# Patient Record
Sex: FEMALE | Race: WHITE | URBAN - METROPOLITAN AREA
[De-identification: names, ages, dates, MRNs, and addresses within clinical notes are randomized per-mention and may not be internally consistent; named-entity substitution may affect disease eponyms.]

---

## 2017-01-04 ENCOUNTER — OFFICE VISIT (OUTPATIENT)
Dept: FAMILY MEDICINE | Facility: CLINIC | Age: 32
End: 2017-01-04
Payer: COMMERCIAL

## 2017-01-04 DIAGNOSIS — Z12.83 SKIN CANCER SCREENING: Primary | ICD-10-CM

## 2017-01-04 DIAGNOSIS — D22.9 MULTIPLE BENIGN MELANOCYTIC NEVI: ICD-10-CM

## 2017-01-04 DIAGNOSIS — Z86.018 HISTORY OF DYSPLASTIC NEVUS: ICD-10-CM

## 2017-01-04 PROCEDURE — 99213 OFFICE O/P EST LOW 20 MIN: CPT | Performed by: FAMILY MEDICINE

## 2017-01-04 NOTE — PROGRESS NOTES
[facility] - PRIMARY CARE SKIN    CC : skin cancer screening (full-body)  SUBJECTIVE:                                                    Lucille Nathan is a 31 year old female who presents to clinic today for a full-body skin exam because of her history of dysplastic nevus and numerous nevi.     Bothersome lesions noticed by the patient or other skin concerns :  Issue One : Since pregnancy began 6 months ago, new moles have been noted on the breasts.  New : YES.  Enlarging : NO.  Bleeding : NO  Itchy or irritating : NO.  Pain or tenderness : NO.    Personal history of skin cancer : NO - history of moderately dysplastic nevus on left mid-abdomen.  Family history of skin cancer : NO.    Sun Exposure History  Sunscreen Use : YES.  Previous history of significant sun exposure: YES - tanned extensively in college    Occupation : office (indoor).    Refer to electronic medical record (EMR) for past medical history and medications.    INTEGUMENTARY/SKIN: POSITIVE for mole changes  ROS : 14 point review of systems was negative except the symptoms listed above in the HPI.    This document serves as a record of the services and decisions personally performed and made by Natasha Aguilar MD. It was created on her behalf by Dilan Gonsales, a trained medical scribe.  The creation of this document is based on the scribe's personal observations and the provider's statements to the medical scribe.  Dilan Gonsales, January 4, 2017 8:06 AM      OBJECTIVE:                                                    GENERAL: healthy, alert and no distress  SKIN: [place]ouse Skin Type - I.  This patient was examined from the top of the head to the bottom of the feet  including scalp, face, neck, back, chest, breasts, buttocks, both arms, both legs, both hands, both feet, all 10 fingers and all 10 toes. The dermatoscope was used to help evaluate pigmented lesions.  Skin Pertinent Findings:  Arms : Scattered, 2 mm - 3 mm in size, brown macules most  "consistent with benign nevi (melanocytic nevi).    Right medial great toe : 2 mm in size, brown macule most consistent with a benign nevus.    Back : Multiple, 2 mm - 5 mm in size, brown macules most consistent with benign nevi (melanocytic nevi).    Diagnostic Test Results:  none     .      ASSESSMENT:                                                      Encounter Diagnoses   Name Primary?     Skin cancer screening Yes     History of dysplastic nevus      Multiple benign melanocytic nevi          PLAN:                                                    Patient Instructions   FUTURE APPOINTMENTS  Follow up in 6 month(s) for a full-body skin cancer screening.    TIPS FOR AVOIDING SKIN CANCER AND PREMATURE SKIN AGING  DOs    Wear a wide-brimmed hat and sunglasses.     Wear sun-protective clothing.    [employer] and other [employer] make sun protective clothing that is stylish, comfortable and cool.    [employer] and other [employer] make UV arm sleeves suitable for golfing, gardening and other activities.      Wear sunscreen on your face every day, even in the winter. (UVA \"aging rays\" penetrates window glass and is just as strong in the winter as in the summer) Sunscreen with SPF > 30 is recommended.    Wear sunscreen on your body and re-apply every 2 hours when exposed to sun. Sunscreen with SPF > 50 is recommended.    You should use about 3 tablespoons of sunscreen to protect your whole body. Thus a typical eight ounce bottle of sunscreen should last 4 applications. Remember, that the SPF rating is compromised if you don t apply enough. Most people only apply 1/2 - 1/3 of the amount they need. Also don t forget areas such as your ears, feet, upper back and harder to reach places. Keep in mind that these amounts should be increased for larger body sizes.    Note that spray sunscreens are only for touch-up application, not as a base layer. Also, use spray sunscreens with caution around small children due to " "inhalation risk.    Product Recommendations:    Look for broad spectrum sunscreen (blocks both UVA and UVB).    Look for titanium dioxide and/or zinc oxide in the active ingredients, which are physical blockers as opposed to chemical blockers. Chemical-free sunscreens should not irritate the skin.    Good examples include: Blue Lizard, EltaMD, Vanicream, Solbar, CeraVe.     For sensitive skin, consider : SkinMedica Essential Defense Mineral Shield Broad Spectrum SPF 35  (You can also find these on amazon.com)      Avoid combination products that include both sunscreen and insect repellant, as sunscreen should be applied every 2 hours, but insect repellant should not be applied as frequently.    Avoid products that include oxybenzone or retinyl palmitate.    For more information:  http://www.skincancer.org/prevention/sun-protection/sunscreen/sunscreens-safe-and-effective    DON'Ts    All tanning damages the skin. Aim for ivory skin year round and you will have less trouble with your skin in years to come. There is no merit in getting \"a base tan\" before a warm weather vacation, as any tanning indicates your body's response to sun damage.    Never use tanning beds. Tanning beds are associated with much higher risks of skin cancer.    Avoid mid-day sunshine (10 AM to 3 PM), if possible.    Stop smoking. Smokers have higher rates of skin cancer and also have premature skin wrinkling.      The patient was counseled about sunscreens and sun avoidance. The patient was counseled to check the skin regularly and report any lesion that is new, changing, itching, scabbing, bleeding or otherwise bothersome. The patient was discharged ambulatory and in stable condition.    Recommendations : q6 month skin exams.      PROCEDURES:                                                    None.    TT : 25 minutes.  CT : 15 minutes.      The information in this document, created by the medical scribe for me, accurately reflects the services " I personally performed and the decisions made by me. I have reviewed and approved this document for accuracy prior to leaving the patient care area.  Natasha Aguilar MD January 4, 2017 8:06 AM  [facility] - PRIMARY CARE SKIN

## 2017-01-04 NOTE — PATIENT INSTRUCTIONS
"FUTURE APPOINTMENTS  Follow up in 6 month(s) for a full-body skin cancer screening.    TIPS FOR AVOIDING SKIN CANCER AND PREMATURE SKIN AGING  DOs    Wear a wide-brimmed hat and sunglasses.     Wear sun-protective clothing.    [employer] and other [employer] make sun protective clothing that is stylish, comfortable and cool.    [employer] and other [employer] make UV arm sleeves suitable for golfing, gardening and other activities.      Wear sunscreen on your face every day, even in the winter. (UVA \"aging rays\" penetrates window glass and is just as strong in the winter as in the summer) Sunscreen with SPF > 30 is recommended.    Wear sunscreen on your body and re-apply every 2 hours when exposed to sun. Sunscreen with SPF > 50 is recommended.    You should use about 3 tablespoons of sunscreen to protect your whole body. Thus a typical eight ounce bottle of sunscreen should last 4 applications. Remember, that the SPF rating is compromised if you don t apply enough. Most people only apply 1/2 - 1/3 of the amount they need. Also don t forget areas such as your ears, feet, upper back and harder to reach places. Keep in mind that these amounts should be increased for larger body sizes.    Note that spray sunscreens are only for touch-up application, not as a base layer. Also, use spray sunscreens with caution around small children due to inhalation risk.    Product Recommendations:    Look for broad spectrum sunscreen (blocks both UVA and UVB).    Look for titanium dioxide and/or zinc oxide in the active ingredients, which are physical blockers as opposed to chemical blockers. Chemical-free sunscreens should not irritate the skin.    Good examples include: Blue Lizard, EltaMD, Vanicream, Solbar, CeraVe.     For sensitive skin, consider : SkinMedica Essential Defense Mineral Shield Broad Spectrum SPF 35  (You can also find these on amazon.com)      Avoid combination products that include both sunscreen and " "insect repellant, as sunscreen should be applied every 2 hours, but insect repellant should not be applied as frequently.    Avoid products that include oxybenzone or retinyl palmitate.    For more information:  http://www.skincancer.org/prevention/sun-protection/sunscreen/sunscreens-safe-and-effective    DON'Ts    All tanning damages the skin. Aim for ivory skin year round and you will have less trouble with your skin in years to come. There is no merit in getting \"a base tan\" before a warm weather vacation, as any tanning indicates your body's response to sun damage.    Never use tanning beds. Tanning beds are associated with much higher risks of skin cancer.    Avoid mid-day sunshine (10 AM to 3 PM), if possible.    Stop smoking. Smokers have higher rates of skin cancer and also have premature skin wrinkling.  "

## 2017-01-04 NOTE — MR AVS SNAPSHOT
"              After Visit Summary   1/4/2017    Lucille Nathan    MRN: [id number]           Patient Information     Date Of Birth          1985        Visit Information        Provider Department      1/4/2017 8:00 AM Nica Aguilar MD [facility] - Primary Care Skin        Today's Diagnoses     Skin cancer screening    -  1     History of dysplastic nevus         Multiple benign melanocytic nevi           Care Instructions    FUTURE APPOINTMENTS  Follow up in 6 month(s) for a full-body skin cancer screening.    TIPS FOR AVOIDING SKIN CANCER AND PREMATURE SKIN AGING  DOs    Wear a wide-brimmed hat and sunglasses.     Wear sun-protective clothing.    [employer] and other [employer] make sun protective clothing that is stylish, comfortable and cool.    Wear [employer] and other [employer] make UV arm sleeves suitable for golfing, gardening and other activities.      Wear sunscreen on your face every day, even in the winter. (UVA \"aging rays\" penetrates window glass and is just as strong in the winter as in the summer) Sunscreen with SPF > 30 is recommended.    Wear sunscreen on your body and re-apply every 2 hours when exposed to sun. Sunscreen with SPF > 50 is recommended.    You should use about 3 tablespoons of sunscreen to protect your whole body. Thus a typical eight ounce bottle of sunscreen should last 4 applications. Remember, that the SPF rating is compromised if you don t apply enough. Most people only apply 1/2 - 1/3 of the amount they need. Also don t forget areas such as your ears, feet, upper back and harder to reach places. Keep in mind that these amounts should be increased for larger body sizes.    Note that spray sunscreens are only for touch-up application, not as a base layer. Also, use spray sunscreens with caution around small children due to inhalation risk.    Product Recommendations:    Look for broad spectrum sunscreen (blocks both UVA and UVB).    Look for " "titanium dioxide and/or zinc oxide in the active ingredients, which are physical blockers as opposed to chemical blockers. Chemical-free sunscreens should not irritate the skin.    Good examples include: Blue Lizard, EltaMD, Vanicream, Solbar, CeraVe.     For sensitive skin, consider : SkinMedica Essential Defense Mineral Shield Broad Spectrum SPF 35  (You can also find these on amazon.com)      Avoid combination products that include both sunscreen and insect repellant, as sunscreen should be applied every 2 hours, but insect repellant should not be applied as frequently.    Avoid products that include oxybenzone or retinyl palmitate.    For more information:  http://www.skincancer.org/prevention/sun-protection/sunscreen/sunscreens-safe-and-effective    DON'Ts    All tanning damages the skin. Aim for ivory skin year round and you will have less trouble with your skin in years to come. There is no merit in getting \"a base tan\" before a warm weather vacation, as any tanning indicates your body's response to sun damage.    Never use tanning beds. Tanning beds are associated with much higher risks of skin cancer.    Avoid mid-day sunshine (10 AM to 3 PM), if possible.    Stop smoking. Smokers have higher rates of skin cancer and also have premature skin wrinkling.        Follow-ups after your visit        Your next 10 appointments already scheduled     Jan 06, 2017  3:30 PM   ESTABLISHED PRENATAL with Sonia Mark MD   [facility] ([facility]    [address]  [facility] [address]   [phone]            Jan 24, 2017 10:45 AM   MyChart OB Short with Sonia Mark MD   [facility] ([facility])    [address]  [facility] [address]   [phone]            Feb 07, 2017  2:45 PM   MyChart OB Short with Sonia Mark MD   [facility] ([facility])    [address]  [place] [address] [phone]            Feb 21, 2017  8:45 AM   MyChart OB Short with Sonia Mark MD   [facility]    [address]  [facility] [id number] [phone]              Who to contact     If you have questions or need follow up information about today's clinic visit or your schedule please contact [facility] - PRIMARY CARE SKIN directly at [phone].  Normal or non-critical lab and imaging results will be communicated to you by MyChart, letter or phone within 4 business days after the clinic has received the results. If you do not hear from us within 7 days, please contact the clinic through [employer] or phone. If you have a critical or abnormal lab result, we will notify you by phone as soon as possible.  Submit refill requests through [employer] or call your pharmacy and they will forward the refill request to us. Please allow 3 business days for your refill to be completed.          Additional Information About Your Visit        Adcasthart Information     [employer] gives you secure access to your electronic health record. If you see a primary care provider, you can also send messages to your care team and make appointments. If you have questions, please call your primary care clinic.  If you do not have a primary care provider, please call [phone] and they will assist you.        Care EveryWhere ID     This is your Care EveryWhere ID. This could be used by other organizations to access your [place] medical records  [id number]        Your Vitals Were     Last Period                   02/02/2016 (Exact Date)            Blood Pressure from Last 3 Encounters:   12/08/16 128/76   11/10/16 126/66   10/11/16 124/68    Weight from Last 3 Encounters:   12/08/16 163 lb 4.8 oz (74.072 kg)   11/10/16 163 lb 4.8 oz (74.072 kg)   10/11/16 157 lb 6.4 oz (71.396 kg)              Today, you had the following     No orders found for display        Primary Care Provider Office Phone # Fax #    Rocio BASS [facility] [place] [phone]       [place] [address] [place] [address]        Thank you!     Thank you for choosing [facility] - PRIMARY CARE [facility]  for your care. Our goal is always to provide you with excellent care. Hearing back from our patients is one way we can continue to improve our services. Please take a few minutes to complete the written survey that you may receive in the mail after your visit with us. Thank you!             Your Updated Medication List - Protect others around you: Learn how to safely use, store and throw away your medicines at www.disposemymeds.org.          This list is accurate as of: 1/4/17  8:14 AM.  Always use your most recent med list.                   Brand Name Dispense Instructions for use    prenatal multivitamin  plus iron 27-0.8 MG Tabs per tablet      Take 1 tablet by mouth daily       TUMS 500 MG chewable tablet   Generic drug:  calcium carbonate      Take 1 chew tab by mouth daily

## 2017-01-06 ENCOUNTER — TELEPHONE (OUTPATIENT)
Dept: OBGYN | Facility: CLINIC | Age: 32
End: 2017-01-06

## 2017-01-06 NOTE — TELEPHONE ENCOUNTER
Called pt, relay MD message. Instruct pt per typical pregnancy dosing of unisom for n/v. Unisom 25 mg tablet, take 1/2 at bedtime. Benadryl 25 mg prn sleep at bedtime. Verbalized understanding.

## 2017-01-06 NOTE — TELEPHONE ENCOUNTER
Unisom or benadryl for sleep is ok, 1/13 is fine for appointment. Protein powder should be fine, please bring to the appointment next week. Thanks.    Sonia Mark MD

## 2017-01-06 NOTE — TELEPHONE ENCOUNTER
Pt currently 27w0d.    Pt calls, reports her appt for today was cancelled d/t Dr. Mark needing to go to hospital.     1) Re-scheduled pt for first available appt with MD: 01/13 in [facility].    Is this appt soon enough or would you like appt worked into your schedule earlier in week?    2) Pt asking if she can use Garden [employer] Life protein powder no more frequently than daily. Indicates it's brown rice protein. Thinking it may help with morning hunger if she has shake at bedtime.     Pt asking as the container says to consult with provider if pregnant before using.    3) Indicates difficulty sleeping and increased anxiety at night. Has been only sleeping 4-5 hours a night. Discussed benadryl 25 mg at bedtime, but not sure if MD would want pt to take this routinely.    Please advise, thanks.

## 2017-01-13 ENCOUNTER — PRENATAL OFFICE VISIT (OUTPATIENT)
Dept: OBGYN | Facility: CLINIC | Age: 32
End: 2017-01-13
Payer: COMMERCIAL

## 2017-01-13 VITALS
WEIGHT: 168.5 LBS | SYSTOLIC BLOOD PRESSURE: 118 MMHG | DIASTOLIC BLOOD PRESSURE: 70 MMHG | TEMPERATURE: 97.8 F | HEART RATE: 65 BPM | BODY MASS INDEX: 22.82 KG/M2 | HEIGHT: 72 IN | RESPIRATION RATE: 12 BRPM

## 2017-01-13 DIAGNOSIS — Z34.03 ENCOUNTER FOR SUPERVISION OF NORMAL FIRST PREGNANCY IN THIRD TRIMESTER: Primary | ICD-10-CM

## 2017-01-13 LAB — HGB BLD-MCNC: 12.6 G/DL (ref 11.7–15.7)

## 2017-01-13 PROCEDURE — 99207 ZZC PRENATAL VISIT: CPT | Performed by: OBSTETRICS & GYNECOLOGY

## 2017-01-13 PROCEDURE — 90715 TDAP VACCINE 7 YRS/> IM: CPT | Performed by: OBSTETRICS & GYNECOLOGY

## 2017-01-13 PROCEDURE — 36415 COLL VENOUS BLD VENIPUNCTURE: CPT | Performed by: OBSTETRICS & GYNECOLOGY

## 2017-01-13 PROCEDURE — 90471 IMMUNIZATION ADMIN: CPT | Performed by: OBSTETRICS & GYNECOLOGY

## 2017-01-13 PROCEDURE — 82950 GLUCOSE TEST: CPT | Performed by: OBSTETRICS & GYNECOLOGY

## 2017-01-13 PROCEDURE — 00000218 ZZHCL STATISTIC OBHBG - HEMOGLOBIN: Performed by: OBSTETRICS & GYNECOLOGY

## 2017-01-13 NOTE — PROGRESS NOTES
SUMMARY  Low risk primip.  BOY!  AB positive/RI/   Flu: 10/11.    One hr glucose tolerance test today. No concerns. Will give Tdap.  Sonia Mark MD

## 2017-01-13 NOTE — PROGRESS NOTES
Screening Questionnaire for Adult Immunization    Are you sick today?   No   Do you have allergies to medications, food, a vaccine component or latex?   No   Have you ever had a serious reaction after receiving a vaccination?   No   Do you have a long-term health problem with heart disease, lung disease, asthma, kidney disease, metabolic disease (e.g. diabetes), anemia, or other blood disorder?   No   Do you have cancer, leukemia, HIV/AIDS, or any other immune system problem?   No   In the past 3 months, have you taken medications that affect  your immune system, such as prednisone, other steroids, or anticancer drugs; drugs for the treatment of rheumatoid arthritis, Crohn s disease, or psoriasis; or have you had radiation treatments?   No   Have you had a seizure, or a brain or other nervous system problem?   No   During the past year, have you received a transfusion of blood or blood     products, or been given immune (gamma) globulin or antiviral drug?   No   For women: Are you pregnant or is there a chance you could become        pregnant during the next month?   YES_ currently pregnant   Have you received any vaccinations in the past 4 weeks?   No     Immunization questionnaire answers were all negative.      MNVFC doesn't apply on this patient    Per orders of Dr. Mark, injection of TDAP given by Janelle Alberto. Patient instructed to remain in clinic for 20 minutes afterwards, and to report any adverse reaction to me immediately.       Screening performed by Janelle Alberto on 1/13/2017 at 11:06 AM.

## 2017-01-13 NOTE — NURSING NOTE
Chief Complaint   Patient presents with     Prenatal Care     28 weeks 0 days     Blood Draw     1 hour GTT     Initial /70 mmHg  Pulse 65  Temp(Src) 97.8  F (36.6  C) (Oral)  Resp 12  Ht 6' (1.829 m)  Wt 168 lb 8 oz (76.431 kg)  BMI 22.85 kg/m2  LMP 02/02/2016 (Exact Date) Estimated body mass index is 22.85 kg/(m^2) as calculated from the following:    Height as of this encounter: 6' (1.829 m).    Weight as of this encounter: 168 lb 8 oz (76.431 kg).  BP completed using cuff size regular Right Arm      Efrain Alberto CMA

## 2017-01-14 LAB — GLUCOSE 1H P 50 G GLC PO SERPL-MCNC: 70 MG/DL (ref 60–129)

## 2017-01-24 ENCOUNTER — PRENATAL OFFICE VISIT (OUTPATIENT)
Dept: OBGYN | Facility: CLINIC | Age: 32
End: 2017-01-24
Payer: COMMERCIAL

## 2017-01-24 VITALS — DIASTOLIC BLOOD PRESSURE: 70 MMHG | SYSTOLIC BLOOD PRESSURE: 128 MMHG | WEIGHT: 171.9 LBS | BODY MASS INDEX: 23.31 KG/M2

## 2017-01-24 DIAGNOSIS — Z34.03 ENCOUNTER FOR SUPERVISION OF NORMAL FIRST PREGNANCY IN THIRD TRIMESTER: Primary | ICD-10-CM

## 2017-01-24 PROCEDURE — 99207 ZZC PRENATAL VISIT: CPT | Performed by: OBSTETRICS & GYNECOLOGY

## 2017-01-24 NOTE — NURSING NOTE
Chief Complaint   Patient presents with     Prenatal Care   29w4d  Brigitte Means MA      Initial /70 mmHg  Wt 171 lb 14.4 oz (77.973 kg)  LMP 02/02/2016 (Exact Date) Estimated body mass index is 23.31 kg/(m^2) as calculated from the following:    Height as of 1/13/17: 6' (1.829 m).    Weight as of this encounter: 171 lb 14.4 oz (77.973 kg).  BP completed using cuff size: regular

## 2017-01-24 NOTE — PROGRESS NOTES
SUMMARY  Low risk primip.  BOY!  AB positive/RI/   Flu: 10/11. Tdap: 1/13    No concerns. Is hoping for unmedicated delivery, aware of options, not opposed to IV/nitrous/epidural if things change. Not planning prenatal classes. Follow up in 2 weeks.  Sonia Mark MD

## 2017-02-01 ENCOUNTER — MYC MEDICAL ADVICE (OUTPATIENT)
Dept: OBGYN | Facility: CLINIC | Age: 32
End: 2017-02-01

## 2017-02-01 NOTE — TELEPHONE ENCOUNTER
I recommend trying some Mylanta or Tums to see if that helps. If not, or if she develops severe pain or a fever, she will need to be seen (in clinic or in the ED after hours.) Thanks.  Sonia Mark MD

## 2017-02-07 ENCOUNTER — PRENATAL OFFICE VISIT (OUTPATIENT)
Dept: OBGYN | Facility: CLINIC | Age: 32
End: 2017-02-07
Payer: COMMERCIAL

## 2017-02-07 VITALS
TEMPERATURE: 97.8 F | HEIGHT: 72 IN | SYSTOLIC BLOOD PRESSURE: 124 MMHG | WEIGHT: 175.2 LBS | DIASTOLIC BLOOD PRESSURE: 80 MMHG | BODY MASS INDEX: 23.73 KG/M2

## 2017-02-07 DIAGNOSIS — Z34.03 ENCOUNTER FOR SUPERVISION OF NORMAL FIRST PREGNANCY IN THIRD TRIMESTER: Primary | ICD-10-CM

## 2017-02-07 PROCEDURE — 99207 ZZC PRENATAL VISIT: CPT | Performed by: OBSTETRICS & GYNECOLOGY

## 2017-02-07 NOTE — PROGRESS NOTES
SUMMARY  Low risk primip.  BOY!  AB positive/RI/   Flu: 10/11. Tdap: 1/13    Good fetal movement, has been doing kick counts intermittently on days where she is busy and not remembering movements--all have been normal. Few BH contractions, nothing regular. Discussed preregistration for L&D. Follow up 2 weeks.  Sonia Mark MD

## 2017-02-07 NOTE — NURSING NOTE
31w4d    Chief Complaint   Patient presents with     Prenatal Care     pt was doing kick counts last week       Initial /80 mmHg  Temp(Src) 97.8  F (36.6  C) (Oral)  Ht 6' (1.829 m)  Wt 175 lb 3.2 oz (79.47 kg)  BMI 23.76 kg/m2  LMP 02/02/2016 (Exact Date) Estimated body mass index is 23.76 kg/(m^2) as calculated from the following:    Height as of this encounter: 6' (1.829 m).    Weight as of this encounter: 175 lb 3.2 oz (79.47 kg).  Medication Reconciliation: complete   Elinor Tejeda CMA

## 2017-02-07 NOTE — PROGRESS NOTES
Lucille Nathan is a 31 year old female  Estimated Date of Delivery: 2017 at 31w4d who presents for a prenatal visit.   AGA doing well, good FM no concerns     This document serves as a record of the services and decisions personally performed and made by Marcelo Funez M.D. It was created on his behalf by Shira Breaux, a trained medical scribe. The creation of this document is based the provider's statements to the medical scribe.  Shira Breaux 2017 2:48 PM     See OB Flowsheet    A: ***  P: Follow up in *** weeks. labor symptoms and symptoms of concern discussed, patient to call     The information in this document, created by the medical scribe for me, accurately reflects the services I personally performed and the decisions made by me. I have reviewed and approved this document for accuracy prior to leaving the patient care area.  2017 2:48 PM     ***   Marcelo Funez M.D.

## 2017-02-07 NOTE — PATIENT INSTRUCTIONS
You can reach your [facility] Care Team any time of the day by calling [phone]. This number will put you in touch with the 24 hour nurse line if the clinic is closed.    To contact your OB/GYN Station Coordinator/Surgery Scheduler please call [phone]. This is a direct number for your care team between 8 a.m. and 4 p.m. Monday through Friday.    [facility] is open for your convenience:  Monday through Friday 8 a.m. to 6 p.m.  Closed weekends and all major holidays.

## 2017-02-21 ENCOUNTER — PRENATAL OFFICE VISIT (OUTPATIENT)
Dept: OBGYN | Facility: CLINIC | Age: 32
End: 2017-02-21
Payer: COMMERCIAL

## 2017-02-21 VITALS
WEIGHT: 174.2 LBS | BODY MASS INDEX: 23.63 KG/M2 | TEMPERATURE: 98 F | SYSTOLIC BLOOD PRESSURE: 112 MMHG | HEART RATE: 64 BPM | DIASTOLIC BLOOD PRESSURE: 72 MMHG

## 2017-02-21 DIAGNOSIS — Z34.03 ENCOUNTER FOR SUPERVISION OF NORMAL FIRST PREGNANCY IN THIRD TRIMESTER: Primary | ICD-10-CM

## 2017-02-21 PROCEDURE — 99207 ZZC PRENATAL VISIT: CPT | Performed by: OBSTETRICS & GYNECOLOGY

## 2017-02-21 NOTE — PROGRESS NOTES
SUMMARY  Low risk primip.  BOY!  AB positive/RI/   Flu: 10/11. Tdap: 1/13    Doing well, no concerns. Follow up appointment in 2 weeks.  Sonia Mark MD

## 2017-02-21 NOTE — MR AVS SNAPSHOT
After Visit Summary   2/21/2017    Lucille Nathan    MRN: [id number]           Patient Information     Date Of Birth          1985        Visit Information        Provider Department      2/21/2017 8:45 AM Sonia Mark MD [facility]        Today's Diagnoses     Encounter for supervision of normal first pregnancy in third trimester    -  1       Follow-ups after your visit        Your next 10 appointments already scheduled     Mar 09, 2017  3:00 PM CST   MyChart OB Short with Sonia Mark MD   [facility] ([facility])    [address]  [facility] [address]   [phone]            Mar 16, 2017  8:30 AM CDT   MyChart OB Short with Sonia Mark MD   [facility] ([facility]    [address]  [facility] [address]   [phone]            Mar 22, 2017  3:00 PM CDT   MyChart OB Short with Sonia Mark MD   [facility] ([facility]    [address]  [facility] [address]   [phone]            Mar 30, 2017  8:30 AM CDT   MyChart OB Short with Sonia Mark MD   [facility] ([facility]    [address]  [facility] [address]   [phone]            Apr 05, 2017  3:00 PM CDT   MyChart OB Short with Sonia Mark MD   [facility] ([facility])    [address]  [facility] [address]   [phone]            Jul 07, 2017  2:00 PM CDT   Office Visit with Nica Aguilar MD   [facility] ([facility] )    [address] [place] [id number] [phone]           Bring a current list of meds and any records pertaining to this visit.  For Physicals, please bring immunization records and any forms needing to be filled out.  Please arrive 10 minutes early  to complete paperwork.              Who to contact     If you have questions or need follow up information about today's clinic visit or your schedule please contact [facility] directly at [phone].  Normal or non-critical lab and imaging results will be communicated to you by MyChart, letter or phone within 4 business days after the clinic has received the results. If you do not hear from us within 7 days, please contact the clinic through [employer] or phone. If you have a critical or abnormal lab result, we will notify you by phone as soon as possible.  Submit refill requests through [employer] or call your pharmacy and they will forward the refill request to us. Please allow 3 business days for your refill to be completed.          Additional Information About Your Visit        [employer] - Biomolecular Determination Information     [employer] gives you secure access to your electronic health record. If you see a primary care provider, you can also send messages to your care team and make appointments. If you have questions, please call your primary care clinic.  If you do not have a primary care provider, please call [phone] and they will assist you.        Care EveryWhere ID     This is your Care EveryWhere ID. This could be used by other organizations to access your [facility] medical records  [id number]        Your Vitals Were     Pulse Temperature Last Period BMI (Body Mass Index)          64 98  F (36.7  C) (Oral) 02/02/2016 (Exact Date) 23.63 kg/m2         Blood Pressure from Last 3 Encounters:   02/21/17 112/72   02/07/17 124/80   01/24/17 128/70    Weight from Last 3 Encounters:   02/21/17 174 lb 3.2 oz (79 kg)   02/07/17 175 lb 3.2 oz (79.5 kg)   01/24/17 171 lb 14.4 oz (78 kg)              Today, you had the following     No orders found for display       Primary Care Provider Office Phone # Fax #    Rocio Tolliver [phone]       [place] [address] [place] [id number]        Thank  you!     Thank you for choosing [facility]  for your care. Our goal is always to provide you with excellent care. Hearing back from our patients is one way we can continue to improve our services. Please take a few minutes to complete the written survey that you may receive in the mail after your visit with us. Thank you!             Your Updated Medication List - Protect others around you: Learn how to safely use, store and throw away your medicines at www.disposemymeds.org.          This list is accurate as of: 2/21/17 10:17 AM.  Always use your most recent med list.                   Brand Name Dispense Instructions for use    prenatal multivitamin  plus iron 27-0.8 MG Tabs per tablet      Take 1 tablet by mouth daily       TUMS 500 MG chewable tablet   Generic drug:  calcium carbonate      Take 1 chew tab by mouth daily

## 2017-02-21 NOTE — NURSING NOTE
Chief Complaint   Patient presents with     Prenatal Care       Initial /72  Pulse 64  Temp 98  F (36.7  C) (Oral)  Wt 174 lb 3.2 oz (79 kg)  LMP 02/02/2016 (Exact Date)  BMI 23.63 kg/m2 Estimated body mass index is 23.63 kg/(m^2) as calculated from the following:    Height as of 2/7/17: 6' (1.829 m).    Weight as of this encounter: 174 lb 3.2 oz (79 kg).  Medication Reconciliation: complete  33w4d

## 2017-03-03 ENCOUNTER — TELEPHONE (OUTPATIENT)
Dept: NURSING | Facility: CLINIC | Age: 32
End: 2017-03-03

## 2017-03-03 ENCOUNTER — ANESTHESIA EVENT (OUTPATIENT)
Dept: OBGYN | Facility: CLINIC | Age: 32
End: 2017-03-03
Payer: COMMERCIAL

## 2017-03-03 ENCOUNTER — HOSPITAL ENCOUNTER (OUTPATIENT)
Facility: CLINIC | Age: 32
Discharge: HOME | End: 2017-03-03
Attending: FAMILY MEDICINE | Admitting: FAMILY MEDICINE
Payer: COMMERCIAL

## 2017-03-03 ENCOUNTER — ANESTHESIA (OUTPATIENT)
Dept: OBGYN | Facility: CLINIC | Age: 32
End: 2017-03-03
Payer: COMMERCIAL

## 2017-03-03 ENCOUNTER — HOSPITAL ENCOUNTER (INPATIENT)
Facility: CLINIC | Age: 32
LOS: 2 days | Discharge: HOME HEALTH | End: 2017-03-05
Attending: FAMILY MEDICINE | Admitting: OBSTETRICS & GYNECOLOGY
Payer: COMMERCIAL

## 2017-03-03 VITALS — SYSTOLIC BLOOD PRESSURE: 136 MMHG | TEMPERATURE: 98.3 F | DIASTOLIC BLOOD PRESSURE: 83 MMHG

## 2017-03-03 PROBLEM — Z36.89 ENCOUNTER FOR TRIAGE IN PREGNANT PATIENT: Status: ACTIVE | Noted: 2017-03-03

## 2017-03-03 LAB
A1 MICROGLOB PLACENTAL VAG QL: POSITIVE
ABO + RH BLD: NORMAL
ABO + RH BLD: NORMAL
ALT SERPL W P-5'-P-CCNC: 18 U/L (ref 0–50)
AST SERPL W P-5'-P-CCNC: 20 U/L (ref 0–45)
CREAT SERPL-MCNC: 0.63 MG/DL (ref 0.52–1.04)
ERYTHROCYTE [DISTWIDTH] IN BLOOD BY AUTOMATED COUNT: 11.9 % (ref 10–15)
GFR SERPL CREATININE-BSD FRML MDRD: NORMAL ML/MIN/1.7M2
HCT VFR BLD AUTO: 38.8 % (ref 35–47)
HGB BLD-MCNC: 13.8 G/DL (ref 11.7–15.7)
HGB BLD-MCNC: 13.8 G/DL (ref 11.7–15.7)
MCH RBC QN AUTO: 32.1 PG (ref 26.5–33)
MCHC RBC AUTO-ENTMCNC: 35.6 G/DL (ref 31.5–36.5)
MCV RBC AUTO: 90 FL (ref 78–100)
PLATELET # BLD AUTO: 212 10E9/L (ref 150–450)
RBC # BLD AUTO: 4.3 10E12/L (ref 3.8–5.2)
SPECIMEN EXP DATE BLD: NORMAL
T PALLIDUM IGG+IGM SER QL: NEGATIVE
URATE SERPL-MCNC: 3.8 MG/DL (ref 2.6–6)
WBC # BLD AUTO: 14.8 10E9/L (ref 4–11)

## 2017-03-03 PROCEDURE — 86900 BLOOD TYPING SEROLOGIC ABO: CPT | Performed by: FAMILY MEDICINE

## 2017-03-03 PROCEDURE — 85027 COMPLETE CBC AUTOMATED: CPT | Performed by: FAMILY MEDICINE

## 2017-03-03 PROCEDURE — 25000128 H RX IP 250 OP 636: Performed by: ANESTHESIOLOGY

## 2017-03-03 PROCEDURE — 84450 TRANSFERASE (AST) (SGOT): CPT | Performed by: FAMILY MEDICINE

## 2017-03-03 PROCEDURE — 84550 ASSAY OF BLOOD/URIC ACID: CPT | Performed by: FAMILY MEDICINE

## 2017-03-03 PROCEDURE — 40000671 ZZH STATISTIC ANESTHESIA CASE

## 2017-03-03 PROCEDURE — 84460 ALANINE AMINO (ALT) (SGPT): CPT | Performed by: FAMILY MEDICINE

## 2017-03-03 PROCEDURE — 25000128 H RX IP 250 OP 636: Performed by: FAMILY MEDICINE

## 2017-03-03 PROCEDURE — 25000125 ZZHC RX 250: Performed by: ANESTHESIOLOGY

## 2017-03-03 PROCEDURE — 86901 BLOOD TYPING SEROLOGIC RH(D): CPT | Performed by: FAMILY MEDICINE

## 2017-03-03 PROCEDURE — 84112 EVAL AMNIOTIC FLUID PROTEIN: CPT | Performed by: FAMILY MEDICINE

## 2017-03-03 PROCEDURE — 99212 OFFICE O/P EST SF 10 MIN: CPT

## 2017-03-03 PROCEDURE — S0020 INJECTION, BUPIVICAINE HYDRO: HCPCS | Performed by: ANESTHESIOLOGY

## 2017-03-03 PROCEDURE — 25000125 ZZHC RX 250: Performed by: OBSTETRICS & GYNECOLOGY

## 2017-03-03 PROCEDURE — 82565 ASSAY OF CREATININE: CPT | Performed by: FAMILY MEDICINE

## 2017-03-03 PROCEDURE — 59400 OBSTETRICAL CARE: CPT | Performed by: OBSTETRICS & GYNECOLOGY

## 2017-03-03 PROCEDURE — 25000125 ZZHC RX 250: Performed by: FAMILY MEDICINE

## 2017-03-03 PROCEDURE — 0KQM0ZZ REPAIR PERINEUM MUSCLE, OPEN APPROACH: ICD-10-PCS | Performed by: OBSTETRICS & GYNECOLOGY

## 2017-03-03 PROCEDURE — 37000011 ZZH ANESTHESIA WARD SERVICE

## 2017-03-03 PROCEDURE — 99232 SBSQ HOSP IP/OBS MODERATE 35: CPT | Performed by: NURSE PRACTITIONER

## 2017-03-03 PROCEDURE — 12000031 ZZH R&B OB CRITICAL

## 2017-03-03 PROCEDURE — 72200001 ZZH LABOR CARE VAGINAL DELIVERY SINGLE

## 2017-03-03 PROCEDURE — 25800025 ZZH RX 258: Performed by: FAMILY MEDICINE

## 2017-03-03 RX ORDER — METHYLERGONOVINE MALEATE 0.2 MG/ML
200 INJECTION INTRAVENOUS
Status: DISCONTINUED | OUTPATIENT
Start: 2017-03-03 | End: 2017-03-04

## 2017-03-03 RX ORDER — EPHEDRINE SULFATE 50 MG/ML
5 INJECTION, SOLUTION INTRAMUSCULAR; INTRAVENOUS; SUBCUTANEOUS
Status: DISCONTINUED | OUTPATIENT
Start: 2017-03-03 | End: 2017-03-04

## 2017-03-03 RX ORDER — BUPIVACAINE HYDROCHLORIDE 2.5 MG/ML
INJECTION, SOLUTION EPIDURAL; INFILTRATION; INTRACAUDAL PRN
Status: DISCONTINUED | OUTPATIENT
Start: 2017-03-03 | End: 2017-03-03

## 2017-03-03 RX ORDER — NALOXONE HYDROCHLORIDE 0.4 MG/ML
.1-.4 INJECTION, SOLUTION INTRAMUSCULAR; INTRAVENOUS; SUBCUTANEOUS
Status: DISCONTINUED | OUTPATIENT
Start: 2017-03-03 | End: 2017-03-04

## 2017-03-03 RX ORDER — SODIUM CHLORIDE, SODIUM LACTATE, POTASSIUM CHLORIDE, CALCIUM CHLORIDE 600; 310; 30; 20 MG/100ML; MG/100ML; MG/100ML; MG/100ML
INJECTION, SOLUTION INTRAVENOUS CONTINUOUS
Status: DISCONTINUED | OUTPATIENT
Start: 2017-03-03 | End: 2017-03-03 | Stop reason: HOSPADM

## 2017-03-03 RX ORDER — ONDANSETRON 2 MG/ML
4 INJECTION INTRAMUSCULAR; INTRAVENOUS EVERY 6 HOURS PRN
Status: DISCONTINUED | OUTPATIENT
Start: 2017-03-03 | End: 2017-03-04

## 2017-03-03 RX ORDER — CARBOPROST TROMETHAMINE 250 UG/ML
250 INJECTION, SOLUTION INTRAMUSCULAR
Status: DISCONTINUED | OUTPATIENT
Start: 2017-03-03 | End: 2017-03-04

## 2017-03-03 RX ORDER — FENTANYL CITRATE 50 UG/ML
50-100 INJECTION, SOLUTION INTRAMUSCULAR; INTRAVENOUS
Status: DISCONTINUED | OUTPATIENT
Start: 2017-03-03 | End: 2017-03-04

## 2017-03-03 RX ORDER — IBUPROFEN 800 MG/1
800 TABLET, FILM COATED ORAL
Status: DISCONTINUED | OUTPATIENT
Start: 2017-03-03 | End: 2017-03-04

## 2017-03-03 RX ORDER — OXYTOCIN/0.9 % SODIUM CHLORIDE 30/500 ML
100-340 PLASTIC BAG, INJECTION (ML) INTRAVENOUS CONTINUOUS PRN
Status: COMPLETED | OUTPATIENT
Start: 2017-03-03 | End: 2017-03-03

## 2017-03-03 RX ORDER — ACETAMINOPHEN 325 MG/1
650 TABLET ORAL EVERY 4 HOURS PRN
Status: DISCONTINUED | OUTPATIENT
Start: 2017-03-03 | End: 2017-03-04

## 2017-03-03 RX ORDER — OXYCODONE AND ACETAMINOPHEN 5; 325 MG/1; MG/1
1 TABLET ORAL
Status: DISCONTINUED | OUTPATIENT
Start: 2017-03-03 | End: 2017-03-04

## 2017-03-03 RX ORDER — SODIUM CHLORIDE, SODIUM LACTATE, POTASSIUM CHLORIDE, CALCIUM CHLORIDE 600; 310; 30; 20 MG/100ML; MG/100ML; MG/100ML; MG/100ML
INJECTION, SOLUTION INTRAVENOUS CONTINUOUS
Status: DISCONTINUED | OUTPATIENT
Start: 2017-03-03 | End: 2017-03-04

## 2017-03-03 RX ORDER — ONDANSETRON 4 MG/1
4 TABLET, ORALLY DISINTEGRATING ORAL EVERY 6 HOURS PRN
Status: DISCONTINUED | OUTPATIENT
Start: 2017-03-03 | End: 2017-03-04

## 2017-03-03 RX ORDER — BETAMETHASONE SODIUM PHOSPHATE AND BETAMETHASONE ACETATE 3; 3 MG/ML; MG/ML
12 INJECTION, SUSPENSION INTRA-ARTICULAR; INTRALESIONAL; INTRAMUSCULAR; SOFT TISSUE EVERY 24 HOURS
Status: DISCONTINUED | OUTPATIENT
Start: 2017-03-03 | End: 2017-03-04

## 2017-03-03 RX ORDER — OXYTOCIN 10 [USP'U]/ML
10 INJECTION, SOLUTION INTRAMUSCULAR; INTRAVENOUS
Status: DISCONTINUED | OUTPATIENT
Start: 2017-03-03 | End: 2017-03-04

## 2017-03-03 RX ORDER — PENICILLIN G POTASSIUM 5000000 [IU]/1
5 INJECTION, POWDER, FOR SOLUTION INTRAMUSCULAR; INTRAVENOUS ONCE
Status: COMPLETED | OUTPATIENT
Start: 2017-03-03 | End: 2017-03-03

## 2017-03-03 RX ORDER — NALBUPHINE HYDROCHLORIDE 10 MG/ML
2.5-5 INJECTION, SOLUTION INTRAMUSCULAR; INTRAVENOUS; SUBCUTANEOUS EVERY 6 HOURS PRN
Status: DISCONTINUED | OUTPATIENT
Start: 2017-03-03 | End: 2017-03-04

## 2017-03-03 RX ORDER — HYDROMORPHONE HYDROCHLORIDE 1 MG/ML
0.5 INJECTION, SOLUTION INTRAMUSCULAR; INTRAVENOUS; SUBCUTANEOUS ONCE
Status: DISCONTINUED | OUTPATIENT
Start: 2017-03-03 | End: 2017-03-04

## 2017-03-03 RX ADMIN — SODIUM CHLORIDE 2.5 MILLION UNITS: 9 INJECTION, SOLUTION INTRAVENOUS at 12:08

## 2017-03-03 RX ADMIN — SODIUM CHLORIDE 2.5 MILLION UNITS: 9 INJECTION, SOLUTION INTRAVENOUS at 16:29

## 2017-03-03 RX ADMIN — SODIUM CHLORIDE, POTASSIUM CHLORIDE, SODIUM LACTATE AND CALCIUM CHLORIDE 500 ML: 600; 310; 30; 20 INJECTION, SOLUTION INTRAVENOUS at 11:15

## 2017-03-03 RX ADMIN — SODIUM CHLORIDE, POTASSIUM CHLORIDE, SODIUM LACTATE AND CALCIUM CHLORIDE 500 ML: 600; 310; 30; 20 INJECTION, SOLUTION INTRAVENOUS at 02:43

## 2017-03-03 RX ADMIN — SODIUM CHLORIDE, POTASSIUM CHLORIDE, SODIUM LACTATE AND CALCIUM CHLORIDE: 600; 310; 30; 20 INJECTION, SOLUTION INTRAVENOUS at 11:52

## 2017-03-03 RX ADMIN — BUPIVACAINE HYDROCHLORIDE 10 ML: 2.5 INJECTION, SOLUTION EPIDURAL; INFILTRATION; INTRACAUDAL at 11:58

## 2017-03-03 RX ADMIN — PENICILLIN G POTASSIUM 5 MILLION UNITS: 5000000 POWDER, FOR SOLUTION INTRAMUSCULAR; INTRAPLEURAL; INTRATHECAL; INTRAVENOUS at 08:11

## 2017-03-03 RX ADMIN — SODIUM CHLORIDE, POTASSIUM CHLORIDE, SODIUM LACTATE AND CALCIUM CHLORIDE: 600; 310; 30; 20 INJECTION, SOLUTION INTRAVENOUS at 08:11

## 2017-03-03 RX ADMIN — OXYTOCIN-SODIUM CHLORIDE 0.9% IV SOLN 30 UNIT/500ML 340 ML/HR: 30-0.9/5 SOLUTION at 17:28

## 2017-03-03 RX ADMIN — BETAMETHASONE SODIUM PHOSPHATE AND BETAMETHASONE ACETATE 12 MG: 3; 3 INJECTION, SUSPENSION INTRA-ARTICULAR; INTRALESIONAL; INTRAMUSCULAR at 09:00

## 2017-03-03 RX ADMIN — Medication: at 12:00

## 2017-03-03 RX ADMIN — SODIUM CHLORIDE, POTASSIUM CHLORIDE, SODIUM LACTATE AND CALCIUM CHLORIDE: 600; 310; 30; 20 INJECTION, SOLUTION INTRAVENOUS at 03:06

## 2017-03-03 NOTE — PROVIDER NOTIFICATION
03/03/17 0336   Provider Notification   Provider Name/Title Dr. Dupont   Method of Notification Phone   Request Evaluate - Remote   Notification Reason SVE   May discharge to home after IV infused

## 2017-03-03 NOTE — H&P
[facility] Labor and Delivery History and Physical    Lucille Nathan MRN# [id number]   Age: 31 year old YOB: 1985     Date of Admission:  3/3/2017    Primary care provider: Rocio Tolliver           Chief Complaint:   Lucille Nathan is a 31 year old female who is  @ 35w0d pregnant and being admitted for rule out SROM.          Pregnancy history:     OBSTETRIC HISTORY:    Obstetric History       T0      TAB0   SAB0   E0   M0   L0       # Outcome Date GA Lbr William/2nd Weight Sex Delivery Anes PTL Lv   1 Current                   EDC: Estimated Date of Delivery: 2017    Prenatal Labs:   Lab Results   Component Value Date    ABO AB 2016    RH  Pos 2016    AS Neg 2016    HEPBANG Nonreactive 2016    CHPCRT  2016     Negative   Negative for C. trachomatis rRNA by transcription mediated amplification.   A negative result by transcription mediated amplification does not preclude the   presence of C. trachomatis infection because results are dependent on proper   and adequate collection, absence of inhibitors, and sufficient rRNA to be   detected.      GCPCRT  2016     Negative   Negative for N. gonorrhoeae rRNA by transcription mediated amplification.   A negative result by transcription mediated amplification does not preclude the   presence of N. gonorrhoeae infection because results are dependent on proper   and adequate collection, absence of inhibitors, and sufficient rRNA to be   detected.      TREPAB Negative 2016    HGB 13.8 2017       GBS Status:   No results found for: GBS    Active Problem List  Patient Active Problem List   Diagnosis     CARDIOVASCULAR SCREENING; LDL GOAL LESS THAN 160     Female fertility problem     History of dysplastic nevus     Supervision of normal first pregnancy     Encounter for triage in pregnant patient       Medication Prior to Admission  Prescriptions Prior to Admission    Medication Sig Dispense Refill Last Dose     calcium carbonate (TUMS) 500 MG chewable tablet Take 1 chew tab by mouth daily   3/2/2017 at Unknown time     Prenatal Vit-Fe Fumarate-FA (PRENATAL MULTIVITAMIN  PLUS IRON) 27-0.8 MG TABS Take 1 tablet by mouth daily   3/2/2017 at Unknown time   .        Maternal Past Medical History:     Past Medical History   Diagnosis Date     NO ACTIVE PROBLEMS                        Family History:   This patient has no significant family history            Social History:   This patient has no significant social history         Review of Systems:   C: NEGATIVE for fever, chills, change in weight  I: NEGATIVE for worrisome rashes, moles or lesions  E: NEGATIVE for vision changes or irritation  E/M: NEGATIVE for ear, mouth and throat problems  R: NEGATIVE for significant cough or SOB  B: NEGATIVE for masses, tenderness or discharge  CV: NEGATIVE for chest pain, palpitations or peripheral edema  GI: NEGATIVE for nausea, abdominal pain, heartburn, or change in bowel habits  : NEGATIVE for frequency, dysuria, or hematuria  M: NEGATIVE for significant arthralgias or myalgia  N: NEGATIVE for weakness, dizziness or paresthesias  E: NEGATIVE for temperature intolerance, skin/hair changes  H: NEGATIVE for bleeding problems  P: NEGATIVE for changes in mood or affect          Physical Exam:     Vitals were reviewed  All vitals stable  Patient Vitals for the past 8 hrs:   BP Temp Temp [facility]   03/03/17 0541 (!) 145/95 98  F (36.7  C) Oral     Constitutional: Awake, alert, cooperative, no apparent distress, and appears stated age.  Eyes: Lids and lashes normal, pupils equal, round and reactive to light, extra ocular muscles intact, sclera clear, conjunctiva normal.  ENT: Normocephalic, without obvious abnormality, atramatic, sinuses nontender on palpation, external ears without lesions, oral pharynx with moist mucus membranes, tonsils without erythema or exudates, gums normal and good  dentition.  Neck: Supple, symmetrical, trachea midline, no adenopathy, thyroid symmetric, not enlarged and no tenderness, skin normal.  Hematologic / Lymphatic: No cervical lymphadenopathy and no supraclavicular lymphadenopathy.  Back: Symmetric, no curvature, spinous processes are non-tender on palpation, paraspinous muscles are non-tender on palpation, no costal vertebral tenderness.  Lungs: No increased work of breathing, good air exchange, clear to auscultation bilaterally, no crackles or wheezing.  Cardiovascular: Regular rate and rhythm, normal S1 and S2, no S3 or S4, and no murmur noted.  Chest / Breast: Breasts symmetrical, skin without lesion(s), no nipple retraction or dimpling, no nipple discharge, no masses palpated, no axillary or supraclavicular adenopathy.  Abdomen: No scars, normal bowel sounds, soft, non-distended, non-tender, no masses palpated, no hepatosplenomegally.  Genitourinary: No urethral discharge, normal external genitalia, no hernia.  Musculoskeletal: No redness, warmth, or swelling of the joints.  Full range of motion noted.  Motor strength is 5 out of 5 all extremities bilaterally.  Tone is normal.  Neurologic: Awake, alert, oriented to name, place and time.  Cranial nerves II-XII are grossly intact.  Motor is 5 out of 5 bilaterally.  Cerebellar finger to nose, heel to shin intact.  Sensory is intact.  Babinski down going, Romberg negative, and gait is normal.  Neuropsychiatric: Normal affect, mood, orientation, memory and insight.  Skin: No rashes, erythema, pallor, petechia or purpura.   Cervix:   Membranes:uncertain, amnisure sent    Dilation: 1.5   Effacement: 100%   Station:+1   Consistency: soft   Position: Mid  Presentation:Cephalic  Fetal Heart Rate Tracing: Tier 1 (normal)  Tocometer: frequency q 2-3 minutes                       Assessment:   Lucille Nathan is a 31 year old female who is  @ 35w0d pregnant and being admitted for rule out SROM.        Plan:    Observation  Await amnisure  EFW 5#    Angelica Pink, DO

## 2017-03-03 NOTE — PROVIDER NOTIFICATION
03/03/17 1413   Provider Notification   Provider Name/Title Dr Lopez   Method of Notification Electronic Page  (Text paged)   Text paged: 8/100/+1, feeling lots of pressure. Going to try a rebolus to see if it helps.

## 2017-03-03 NOTE — PROVIDER NOTIFICATION
03/03/17 1620   Provider Notification   Provider Name/Title Dr Lopez   Method of Notification At Bedside   Request Attend Delivery

## 2017-03-03 NOTE — PROVIDER NOTIFICATION
03/03/17 1145   Provider Notification   Provider Name/Title Dr Jiménez   Method of Notification Electronic Page   Request Evaluate in Person   Notification Reason Pain   MDA paged to come for epidural placement.

## 2017-03-03 NOTE — PROVIDER NOTIFICATION
03/03/17 1554   Provider Notification   Provider Name/Title Dr Lopez   Method of Notification Electronic Page   OB paged and updated on SVE, practice push. Orders received to labor down, OB will come after clinic and then start pushing.

## 2017-03-03 NOTE — IP AVS SNAPSHOT
[facility]    [address]    [facility] [address]    Phone:  [phone]    Fax:  [phone]                                       After Visit Summary   3/3/2017    Lucille Nathan    MRN: [id number]           After Visit Summary Signature Page     I have received my discharge instructions, and my questions have been answered. I have discussed any challenges I see with this plan with the nurse or doctor.    ..........................................................................................................................................  Patient/Patient Representative Signature      ..........................................................................................................................................  Patient Representative Print Name and Relationship to Patient    ..................................................               ................................................  Date                                            Time    ..........................................................................................................................................  Reviewed by Signature/Title    ...................................................              ..............................................  Date                                                            Time

## 2017-03-03 NOTE — PLAN OF CARE
Bedside report received from Sofia Mercer RN, cares assumed. Patient having regular contractions, rates them 1/10. Leaking clear fluid. Plan to continue to monitor and start Penicillin for 35 weeks and unknown GBS.

## 2017-03-03 NOTE — L&D DELIVERY NOTE
Delivery Summary    Lucille Jac MRN# [id number]   Age: 31 year old YOB: 1985     The patient was admitted with pPROM at 35w0d and began laboring 4 hours later. She progressed to complete and began pushing following 2 prolonged decels to the 90s, therefore she did not labor down. Infant's head was delivered atraumatically over perineum in the AMY position. Nuchal cord absent. Anterior shoulder and posterior shoulders were easily delivered without problems followed by remainder of infant. Infant vigorous and crying.  Drying and resuscitative measures performed. Delayed cord clamping was performed prior to cutting. Cord gases were not obtained. Apgars of 8 and 9. Cord blood was collected. Pitocin in IV fluid was administered per protocol. The placenta delivered spontaneously intact with 3 vessel cord and revealed normal anatomy. Uterine massage was performed and the fundus was found to be firm. Vagina, cervix, and perineum were inspected for lacerations. Small 2nd degree laceration was noted and repaired with 3-0 vicryl in a running fashion. All counts were correct. Mom and baby stable in room. .    Casandra Lopez MD         Labor Event Times    Labor onset date:  3/3/17 Onset time:  11:00 AM   Dilation complete date:  3/3/17 Complete time:   3:54 PM   Start pushing date/time:  3/3/2017 1623            Delivery/Placenta Date and Time    Delivery Date:  3/3/17 Delivery Time:   5:24 PM   Placenta Date/Time:  3/3/2017  5:28 PM   Oxytocin given at the time of delivery:  after delivery of placenta      Vaginal Counts    Initial count performed by 2 team members:   Two Team Members   VICENTE Gomes MD          Needles Suture [employer]   Initial counts 2  5    Added to count       Final counts          Placed during labor Accounted for at the end of labor   No    No    No                Apgars    Living status:  Yes    1 Minute 5 Minute 10 Minute 15 Minute 20  Minute   Skin color: 0  1       Heart rate: 2  2       Reflex irritability: 2  2       Muscle tone: 2  2       Respiratory effort: 2  2       Total: 8  9          Apgars assigned by:  RICHARD GOVEA CNP      Cord    Vessels:  3 Vessels Complications:  None   Cord Blood Disposition:  Lab          [place] Resuscitation    Methods:  None       Care at Delivery:  Called by Dr. Lopez for 35 weeks gestation, .  Infant cried at delivery, delayed cord clamping, placed on maternal abdomen, warmed and dried, bulb suctioned.  Infant appears well in no respiratory distress.  Will stay with mother, recommend every 3 hour feedings with small supplementation as infant is 35 weeks, and to keep infant warm.  Cecile RAMOS CNP  3/3/2017 , 5:33 PM.     Output in Delivery Room:  Voided      Labor Events and Shoulder Dystocia    Fetal Tracing Prior to Delivery:  Category 2   Shoulder dystocia present?:  Neg            Delivery (Maternal) (Provider to Complete) (415283)    Episiotomy:  None   Perineal lacerations:  2nd Repaired?:  Yes   Vaginal laceration?:  No    Cervical laceration?:  No          Mother's Information  Mother: Lucille Nathan #[id number]    Start of Mother's Information     IO Blood Loss  17 1100 - 17 1749    Mom's I/O Activity            End of Mother's Information  Mother: Lucille Nathan #[id number]            Delivery - Provider to Complete ([id number])    Delivering clinician:  TOSIN LOPEZ   Attempted Delivery Types (Choose all that apply):  Spontaneous Vaginal Delivery   Delivery Type (Choose the 1 that will go to the Birth History):  Vaginal, Spontaneous Delivery                     Other personnel:   Provider Role   MENDEL VELEZ Registered Nurse            Placenta    Delayed Cord Clamping:  Done   Date/Time:  3/3/2017  5:28 PM   Removal:  Expressed   Disposition:  Hospital disposal      Anesthesia    Method:  Epidural         Presentation and Position     Presentation:  Vertex   Position:  Left Occiput Anterior                    Casandra Lopez MD

## 2017-03-03 NOTE — TELEPHONE ENCOUNTER
Call Type: Triage Call    Presenting Problem: 35 week pregnant, She noticed some pinkish on the  tissue after using the bathroom. She states it was not even red, and  very scant. Continued fetal movement. Denies abdominal pain. She  will continue to monitor and call back with continued spotting or  other issues. Advised when to be seen in ER.  Triage Note:  Guideline Title: Pregnancy: Vaginal Bleeding, More Than 20 Weeks  Recommended Disposition: Provide Home/Self Care  Original Inclination: Wanted to speak with a nurse  Override Disposition:  Intended Action: Follow advice given  Physician Contacted: No  Light painless vaginal spotting within 24 hours of gynecological exam or sexual  intercourse ?  YES  Regular contractions less than 5 minutes apart ? NO  Sudden gush or trickle of fluid from the vagina ? NO  Vaginal bleeding AND less than 20 weeks gestation ? NO  Any intermittent contractions ? NO  Continuous hardening (non-relaxing) of uterus or abdomen, regardless of presence  or severity of pain ? NO  New or worsening signs and symptoms that may indicate shock ? NO  Feeling of baby coming or wanting to push (urge to bear down) ? NO  Recent (within past 12 hours) vaginal or abdominal trauma ? NO  Intermittent light painless vaginal spotting or bleeding AND more than 24 hours  after sexual intercourse or GYN exam ? NO  Unbearable abdominal/pelvic pain ? NO  Umbilical cord or any part of the baby (head, bottom, arm or leg) at the opening  of the vagina ? NO  Gush or leakage of green or green-tinged or port-wine colored fluid (reddish and  watery) from the vagina ? NO  Decreased fetal movement (less than 10 kicks/movements within two hours or a  significant change in usual pattern compared to previous days) ? NO  Foul smelling or unusual vaginal discharge (such as change in color, odor, or  amount of vaginal discharge) ? NO  Cerclage (cervix sutured closed) in place AND any vaginal bleeding or fluid ? NO  Any  abdominal cramping OR back pain ? NO  Continuous bright red vaginal bleeding for more than 15 minutes (more than  spotting) ? NO  Persistent dizziness OR lightheadedness that does not resolve within ten minutes ?  NO  Vaginal bleeding more than bloody show ? NO  Profuse vaginal bleeding not contained by pads ? NO  Physician Instructions:  Care Advice: Post GYN exam:   - Spotting within the first 24 hours after a  pelvic exam, ultrasound, Pap smear, or cervical biopsy is common as the  cervix is very vascular and bleeds easily.   - Spotting should turn  brownish and disappear within 48 hours.   - Persistent or increased  bleeding or bleeding associated with pain requires immediate evaluation.

## 2017-03-03 NOTE — IP AVS SNAPSHOT
[facility] Labor and Delivery    [address]    [facility] [address]    Phone:  [phone]    Fax:  [phone]                                       After Visit Summary   3/3/2017    Lucille Nathan    MRN: [id number]           After Visit Summary Signature Page     I have received my discharge instructions, and my questions have been answered. I have discussed any challenges I see with this plan with the nurse or doctor.    ..........................................................................................................................................  Patient/Patient Representative Signature      ..........................................................................................................................................  Patient Representative Print Name and Relationship to Patient    ..................................................               ................................................  Date                                            Time    ..........................................................................................................................................  Reviewed by Signature/Title    ...................................................              ..............................................  Date                                                            Time

## 2017-03-03 NOTE — PROVIDER NOTIFICATION
03/03/17 1315   Provider Notification   Provider Name/Title Dr Lopez   Method of Notification In Department   OB at nursing station, updated on SVE, epidural, contractions, FHTs, NNP consult. No new orders received.

## 2017-03-03 NOTE — DISCHARGE INSTRUCTIONS
Discharge Instruction for Undelivered Patients      You were seen for: Bleeding Assessment  We Consulted: Dr. Pink  You had (Test or Medicine):uterine and fetal monitoring, Cervical exam x 2, IV Hydration, Hgb    Diet:   Drink 8 to 12 glasses of liquids (milk, juice, water) every day.  You may eat meals and snacks.     Activity:  As tolerated.  Call your doctor or nurse midwife if your baby is moving less than usual.     Call your provider if you notice:  Swelling in your face or increased swelling in your hands or legs.  Headaches that are not relieved by Tylenol (acetaminophen).  Changes in your vision (blurring: seeing spots or stars.)  Nausea (sick to your stomach) and vomiting (throwing up).   Weight gain of 5 pounds or more per week.  Heartburn that doesn't go away.  Signs of bladder infection: pain when you urinate (use the toilet), need to go more often and more urgently.  The bag of rey (rupture of membranes) breaks, or you notice leaking in your underwear.  Bright red blood in your underwear.  Abdominal (lower belly) or stomach pain.  For first baby: Contractions (tightening) less than 5 minutes apart for one hour or more.  Increase or change in vaginal discharge (note the color and amount)    Follow-up:  Make an appointment to be seen on Monday, March 6 or Tuesday, March 7 orcall sooner with questions or concerns

## 2017-03-03 NOTE — PROVIDER NOTIFICATION
03/03/17 0210   Provider Notification   Provider Name/Title Dr. Dupont   Method of Notification Phone   Request Evaluate - Remote   Notification Reason Patient Arrived;Uterine Activity;Bleeding;SVE   Notified of admission assessment, elevated BP, bleeding.  Orders received.

## 2017-03-03 NOTE — PROVIDER NOTIFICATION
03/03/17 0945   Provider Notification   Provider Name/Title Dr Lopez   Method of Notification At Bedside   Ob at bedside, updated on SVE, emesis, labs normal. No new orders received.

## 2017-03-03 NOTE — PROVIDER NOTIFICATION
03/03/17 1510   Provider Notification   Provider Name/Title Dr Lopez   Method of Notification Electronic Page   Request Evaluate - Remote   Notification Reason Decels   OB paged and updated on prolonged decel, SVE, interventions. Orders received to continue to watch and if happens again call to come (MD in clinic).

## 2017-03-03 NOTE — PROVIDER NOTIFICATION
03/03/17 1006   Provider Notification   Provider Name/Title Grisel NNP   Method of Notification Electronic Page   Request Evaluate in Person   Updated on 35 weeks, SROM, desires consult.

## 2017-03-03 NOTE — IP AVS SNAPSHOT
MRN:[id number]                      After Visit Summary   3/3/2017    Lucille Nathan    MRN: [id number]           Thank you!     Thank you for choosing [facility] for your care. Our goal is always to provide you with excellent care. Hearing back from our patients is one way we can continue to improve our services. Please take a few minutes to complete the written survey that you may receive in the mail after you visit. If you would like to speak to someone directly about your visit please contact Patient Relations at [phone]. Thank you!          Patient Information     Date Of Birth          1985        About your hospital stay     You were admitted on:  March 3, 2017 You last received care in the:  [facility] Postpartum    You were discharged on:  March 5, 2017       Who to Call     For medical emergencies, please call 911.  For non-urgent questions about your medical care, please call your primary care provider or clinic, [phone]          Attending Provider     Provider Specialty    Angelica Pink DO OB/Gyn    Casandra Lopez MD OB/Gyn       Primary Care Provider Office Phone # Fax #    Rocio KALI Tolliver [phone]       [facility] [address] [place] [address]        After Care Instructions     Activity       Review discharge instructions            Diet       Resume previous diet            Discharge Instructions - Postpartum visit       Schedule postpartum visit with your provider and return to clinic in 6 weeks.                  Your next 10 appointments already scheduled     Mar 09, 2017  3:00 PM CST   [facility] with Sonia Mark MD   [facility]    [address]  [facility] [id number] [phone]            Mar 16, 2017  8:30 AM CDT   [facility] Short with Sonia Mark MD   [facility]    [address]  [facility] [address]   [phone]            Mar 22, 2017  3:00 PM CDT   MyChart OB Short with Sonia Mark MD   [facility] ([facility])    [address]  [facility] [address]   [phone]            Mar 30, 2017  8:30 AM CDT   MyChart OB Short with Sonia Mark MD   [facility] ([facility])    [address]  [facility] [address]   [phone]            Apr 05, 2017  3:00 PM CDT   MyChart OB Short with Sonia Mark MD   [facility] ([facility])    [address]  [facility] [address]   [phone]            Jul 07, 2017  2:00 PM CDT   Office Visit with Nica Aguilar MD   [facility] ([facility] )    [address] [place] [address]   [phone]           Bring a current list of meds and any records pertaining to this visit.  For Physicals, please bring immunization records and any forms needing to be filled out.  Please arrive 10 minutes early to complete paperwork.              Further instructions from your care team       Postpartum Vaginal Delivery Instructions    Activity       Ask family and friends for help when you need it.    Do not place anything in your vagina for 6 weeks.    You are not restricted on other activities, but take it easy for a few weeks to allow your body to recover from delivery.  You are able to do any activities you feel up to that point.    No driving until you have stopped taking your pain medications (usually two weeks after delivery).     Call your health care provider if you have any of these symptoms:       Increased pain, swelling, redness, or fluid around your stiches from an episiotomy or perineal tear.    A fever above 100.4 F (38 C) with or without chills when placing a thermometer under your  tongue.    You soak a sanitary pad with blood within 1 hour, or you see blood clots larger than a golf ball.    Bleeding that lasts more than 6 weeks.    Vaginal discharge that smells bad.    Severe pain, cramping or tenderness in your lower belly area.    A need to urinate more frequently (use the toilet more often), more urgently (use the toilet very quickly), or it burns when you urinate.    Nausea and vomiting.    Redness, swelling or pain around a vein in your leg.    Problems breastfeeding or a red or painful area on your breast.    Chest pain and cough or are gasping for air.    Problems coping with sadness, anxiety, or depression.  If you have any concerns about hurting yourself or the baby, call your provider immediately.     You have questions or concerns after you return home.     Keep your hands clean:  Always wash your hands before touching your perineal area and stitches.  This helps reduce your risk of infection.  If your hands aren't dirty, you may use an alcohol hand-rub to clean your hands. Keep your nails clean and short.        Pending Results     No orders found from 3/1/2017 to 3/4/2017.            Statement of Approval     Ordered          03/05/17 0831  I have reviewed and agree with all the recommendations and orders detailed in this document.  EFFECTIVE NOW     Approved and electronically signed by:  Nichol Young MD             Admission Information     Date & Time Provider Department Dept. Phone    3/3/2017 Casandra Lopez MD [facility] Postpartum [phone]      Your Vitals Were     Blood Pressure Pulse Temperature Respirations Last Period       135/85 98 98.1  F (36.7  C) (Oral) 18 02/02/2016 (Exact Date)       MyChart Information     [employer] gives you secure access to your electronic health record. If you see a primary care provider, you can also send messages to your care team and make appointments. If you have questions, please call your primary care clinic.  If you  do not have a primary care provider, please call [phone] and they will assist you.        Care EveryWhere ID     This is your Care EveryWhere ID. This could be used by other organizations to access your [place] medical records  [id number]           Review of your medicines      CONTINUE these medicines which have NOT CHANGED        Dose / Directions    prenatal multivitamin  plus iron 27-0.8 MG Tabs per tablet        Dose:  1 tablet   Take 1 tablet by mouth daily   Refills:  0       TUMS 500 MG chewable tablet   Generic drug:  calcium carbonate        Dose:  1 chew tab   Take 1 chew tab by mouth daily   Refills:  0                Protect others around you: Learn how to safely use, store and throw away your medicines at www.disposemymeds.org.             Medication List: This is a list of all your medications and when to take them. Check marks below indicate your daily home schedule. Keep this list as a reference.      Medications           Morning Afternoon Evening Bedtime As Needed    prenatal multivitamin  plus iron 27-0.8 MG Tabs per tablet   Take 1 tablet by mouth daily                                TUMS 500 MG chewable tablet   Take 1 chew tab by mouth daily   Generic drug:  calcium carbonate

## 2017-03-03 NOTE — ANESTHESIA PROCEDURE NOTES
Peripheral nerve/Neuraxial procedure note : epidural catheter  Pre-Procedure  Performed by ERICA EDWARDS  Referred by RAY  Location: OB    Procedure Times:3/3/2017 11:46 AM and 3/3/2017 11:59 AM  Pre-Anesthestic Checklist: patient identified, IV checked, risks and benefits discussed, informed consent, monitors and equipment checked and pre-op evaluation    Timeout  Correct Patient: Yes   Correct Procedure: Yes   Correct Site: Yes   Correct Laterality: N/A   Correct Position: Yes   Site Marked: N/A   .   Procedure Documentation    .    Procedure:    Epidural catheter.  Insertion Site:L3-4  (midline approach) Injection technique: LORT saline   Local skin infiltrated with mL of 1% lidocaine.  ANDRES at 4 cm     Patient Prep;mask, sterile gloves, povidone-iodine 7.5% surgical scrub, patient draped.  .  Needle: Touhy needle (17 G. 3.5 in). # of attempts: 1. # of redirects:.  Spinal Needle: . . . Catheter: 19 G . .  Catheter threaded easily  6 cm epidural space.  10 cm at skin.   .    Assessment/Narrative  Paresthesias: No.  .  .  Aspiration negative for heme or CSF  . Test dose of 3 mL lidocaine 1.5% w/ 1:200,000 epinephrine at 11:55.  Test dose negative for signs of intravascular, subdural or intrathecal injection.

## 2017-03-03 NOTE — PLAN OF CARE
Data: [facility] at 0140.   Reason for maternal/fetal assessment per patient is Vaginal Bleeding  . Patient is a . Prenatal record reviewed.      Obstetric History       T0      TAB0   SAB0   E0   M0   L0       # Outcome Date GA Lbr William/2nd Weight Sex Delivery Anes PTL Lv   1 Current                  Medical History:   Past Medical History   Diagnosis Date     NO ACTIVE PROBLEMS    . Gestational Age 35w0d. VSS. Cervix: closed.  Fetal movement present. Patient denies cramping, backache, vaginal discharge, pelvic pressure, UTI symptoms, GI problems, bloody show, vaginal bleeding, edema, headache, visual disturbances, epigastric or URQ pain, abdominal pain, rupture of membranes. Support persons  Josemanuel present.  Action: Verbal consent for EFM. Triage assessment completed. EFM applied for fetal surveiullance. Uterine assessment regular . Fetal assessment: Presumed adequate fetal oxygenation documented (see flow record). . Patient instructed to report change in fetal movement, vaginal leaking of fluid or bleeding, abdominal pain, or any concerns related to the pregnancy to her nurse/physician.   Response: Dr. Pink informed of assessment. Plan per provider is discharge to home at this time with instructions. Patient verbalized understanding of education and verbalized agreement with plan. Discharged ambulatory at 0445.

## 2017-03-03 NOTE — PLAN OF CARE
Amnisure positive. Pt denies pain at this time. Comfortable in room with significant other. Will Continue to monitor.

## 2017-03-03 NOTE — PROVIDER NOTIFICATION
03/03/17 0855   Provider Notification   Provider Name/Title Dr Lopez   Method of Notification Electronic Page   Request Evaluate - Remote   OB paged and updated on arrival, 35 weeks, contractions, pain, PCN, SVE, BPs, no symptoms. Orders received for PIH labs and Betamethasone dose. Watch contractions for next 2 hours. Will be on unit soon.

## 2017-03-03 NOTE — PLAN OF CARE
Patient admits to labor and delivery with c/o vaginal bleeding.  at 35 weeks gestation.  States the she woke up to go the the bathroom and noticed pink tinge as she wiped.  Called nurse line and was told to come to hospital if she had any more.  Denies ROM or intercourse in the past 24 hours.  Also has noticed small cramping but denies paIN  Placed on monitors and assessment completed.

## 2017-03-03 NOTE — IP AVS SNAPSHOT
MRN:[id number]                      After Visit Summary   3/3/2017    Lucille Nathan    MRN: [id number]           Thank you!     Thank you for choosing [facility] for your care. Our goal is always to provide you with excellent care. Hearing back from our patients is one way we can continue to improve our services. Please take a few minutes to complete the written survey that you may receive in the mail after you visit. If you would like to speak to someone directly about your visit please contact Patient Relations at [phone]. Thank you!          Patient Information     Date Of Birth          1985        About your hospital stay     You were admitted on:  March 3, 2017 You last received care in the:  [facility] Delivery    You were discharged on:  March 3, 2017       Who to Call     For medical emergencies, please call 911.  For non-urgent questions about your medical care, please call your primary care provider or clinic, [phone]          Attending Provider     Provider Specialty    Angelica Pink DO OB/Gyn       Primary Care Provider Office Phone # Fax #    Rocio Tolliver [phone]       [facility] [address] [place] [address]        Your next 10 appointments already scheduled     Mar 09, 2017  3:00 PM CST   MyChart OB Short with Sonia Mark MD   [facility]    [address]  [facility] [address]   [phone]            Mar 16, 2017  8:30 AM CDT   MyChart OB Short with Sonia Mark MD   [facility]    [address]  [facility] [address]   [phone]            Mar 22, 2017  3:00 PM CDT   MyChart OB Short with Sonia Mark MD   [facility]    [address] [place]  [facility] [address]   [phone]            Mar 30, 2017  8:30  AM CDT   [facility]Chart OB Short with S[facility] MD   [facility]    [address]  [facility] [address]   [phone]            Apr 05, 2017  3:00 PM CDT   MyChart OB Short with Sonia Mark MD   [facility]    [address]  [facility] [address]   [phone]            Jul 07, 2017  2:00 PM CDT   Office Visit with Nica Aguilar MD   [facility] )    [address] [place] [address]  [address] [place] [address] [phone]           Bring a current list of meds and any records pertaining to this visit.  For Physicals, please bring immunization records and any forms needing to be filled out.  Please arrive 10 minutes early to complete paperwork.              Further instructions from your care team       Discharge Instruction for Undelivered Patients      You were seen for: Bleeding Assessment  We Consulted: Dr. Pink  You had (Test or Medicine):uterine and fetal monitoring, Cervical exam x 2, IV Hydration, Hgb    Diet:   Drink 8 to 12 glasses of liquids (milk, juice, water) every day.  You may eat meals and snacks.     Activity:  As tolerated.  Call your doctor or nurse midwife if your baby is moving less than usual.     Call your provider if you notice:  Swelling in your face or increased swelling in your hands or legs.  Headaches that are not relieved by Tylenol (acetaminophen).  Changes in your vision (blurring: seeing spots or stars.)  Nausea (sick to your stomach) and vomiting (throwing up).   Weight gain of 5 pounds or more per week.  Heartburn that doesn't go away.  Signs of bladder infection: pain when you urinate (use the toilet), need to go more often and more urgently.  The bag of rey (rupture of membranes) breaks, or you notice leaking in your underwear.  Bright red blood in your  underwear.  Abdominal (lower belly) or stomach pain.  For first baby: Contractions (tightening) less than 5 minutes apart for one hour or more.  Increase or change in vaginal discharge (note the color and amount)    Follow-up:  Make an appointment to be seen on Monday, March 6 or Tuesday, March 7 orcall sooner with questions or concerns          Pending Results     No orders found from 3/1/2017 to 3/4/2017.            Admission Information     Date & Time Provider Department Dept. Phone    3/3/2017 Angelica Pink,  [facility] Labor and Delivery [phone]      Your Vitals Were     Blood Pressure Temperature Last Period             130/80 98.3  F (36.8  C) (Oral) 02/02/2016 (Exact Date)         MyChart Information     [employer] gives you secure access to your electronic health record. If you see a primary care provider, you can also send messages to your care team and make appointments. If you have questions, please call your primary care clinic.  If you do not have a primary care provider, please call [phone] and they will assist you.        Care EveryWhere ID     This is your Care EveryWhere ID. This could be used by other organizations to access your [place] medical records  [id number]           Review of your medicines      UNREVIEWED medicines. Ask your doctor about these medicines        Dose / Directions    prenatal multivitamin  plus iron 27-0.8 MG Tabs per tablet        Dose:  1 tablet   Take 1 tablet by mouth daily   Refills:  0       TUMS 500 MG chewable tablet   Generic drug:  calcium carbonate        Dose:  1 chew tab   Take 1 chew tab by mouth daily   Refills:  0                Protect others around you: Learn how to safely use, store and throw away your medicines at www.disposemymeds.org.             Medication List: This is a list of all your medications and when to take them. Check marks below indicate your daily home schedule. Keep this list as a reference.      Medications            Morning Afternoon Evening Bedtime As Needed    prenatal multivitamin  plus iron 27-0.8 MG Tabs per tablet   Take 1 tablet by mouth daily                                TUMS 500 MG chewable tablet   Take 1 chew tab by mouth daily   Generic drug:  calcium carbonate

## 2017-03-03 NOTE — CONSULTS
Neonatology Antepartum Consultation    Lucille Nathan MRN# [id number]   YOB: 1985 Age: 31 year old   Date of Admission: 3/3/2017     Reason for consult: I was asked by Dr. Pink to evaluate this patient for premature delivery at 35 weeks.      I was asked to provide antepartum counseling for Lucille Nathan at the request of Dr. Pink secondary to PTL  She is currently 35 weeks and has a history significant for PTL with SROM today. Betamethasone was administered today (3/3) at 0900 Ms. Nathan, accompanied by her other:, was counseled on the expected hospital course, potential risks, and outcomes associated with an infant born at approximately 35 weeks gestation. The counseling included:  initial delivery room stabilization, respiratory course, lung development, infection, nutrition, growth and development, and long term outcomes. Please feel free to call with any additional questions or concerns.    Cecile RAMOS, CNP  3/3/2017 , 1:43 PM.  Face to face time 20 minutes

## 2017-03-04 LAB — HGB BLD-MCNC: 12.6 G/DL (ref 11.7–15.7)

## 2017-03-04 PROCEDURE — 85018 HEMOGLOBIN: CPT | Performed by: OBSTETRICS & GYNECOLOGY

## 2017-03-04 PROCEDURE — 25000132 ZZH RX MED GY IP 250 OP 250 PS 637: Performed by: OBSTETRICS & GYNECOLOGY

## 2017-03-04 PROCEDURE — 12000027 ZZH R&B OB

## 2017-03-04 PROCEDURE — 99214 OFFICE O/P EST MOD 30 MIN: CPT

## 2017-03-04 PROCEDURE — 36415 COLL VENOUS BLD VENIPUNCTURE: CPT | Performed by: OBSTETRICS & GYNECOLOGY

## 2017-03-04 RX ORDER — NALOXONE HYDROCHLORIDE 0.4 MG/ML
.1-.4 INJECTION, SOLUTION INTRAMUSCULAR; INTRAVENOUS; SUBCUTANEOUS
Status: DISCONTINUED | OUTPATIENT
Start: 2017-03-04 | End: 2017-03-05 | Stop reason: HOSPADM

## 2017-03-04 RX ORDER — HYDROCODONE BITARTRATE AND ACETAMINOPHEN 5; 325 MG/1; MG/1
1-2 TABLET ORAL EVERY 4 HOURS PRN
Status: DISCONTINUED | OUTPATIENT
Start: 2017-03-04 | End: 2017-03-05 | Stop reason: HOSPADM

## 2017-03-04 RX ORDER — MISOPROSTOL 200 UG/1
400 TABLET ORAL
Status: DISCONTINUED | OUTPATIENT
Start: 2017-03-04 | End: 2017-03-05 | Stop reason: HOSPADM

## 2017-03-04 RX ORDER — BISACODYL 10 MG
10 SUPPOSITORY, RECTAL RECTAL DAILY PRN
Status: DISCONTINUED | OUTPATIENT
Start: 2017-03-05 | End: 2017-03-05 | Stop reason: HOSPADM

## 2017-03-04 RX ORDER — LANOLIN 100 %
OINTMENT (GRAM) TOPICAL
Status: DISCONTINUED | OUTPATIENT
Start: 2017-03-04 | End: 2017-03-05 | Stop reason: HOSPADM

## 2017-03-04 RX ORDER — OXYTOCIN 10 [USP'U]/ML
10 INJECTION, SOLUTION INTRAMUSCULAR; INTRAVENOUS
Status: DISCONTINUED | OUTPATIENT
Start: 2017-03-04 | End: 2017-03-05 | Stop reason: HOSPADM

## 2017-03-04 RX ORDER — AMOXICILLIN 250 MG
1-2 CAPSULE ORAL 2 TIMES DAILY
Status: DISCONTINUED | OUTPATIENT
Start: 2017-03-04 | End: 2017-03-05 | Stop reason: HOSPADM

## 2017-03-04 RX ORDER — OXYTOCIN/0.9 % SODIUM CHLORIDE 30/500 ML
340 PLASTIC BAG, INJECTION (ML) INTRAVENOUS CONTINUOUS PRN
Status: DISCONTINUED | OUTPATIENT
Start: 2017-03-04 | End: 2017-03-05 | Stop reason: HOSPADM

## 2017-03-04 RX ORDER — OXYTOCIN/0.9 % SODIUM CHLORIDE 30/500 ML
100 PLASTIC BAG, INJECTION (ML) INTRAVENOUS CONTINUOUS
Status: DISCONTINUED | OUTPATIENT
Start: 2017-03-04 | End: 2017-03-04 | Stop reason: CLARIF

## 2017-03-04 RX ORDER — IBUPROFEN 400 MG/1
400-800 TABLET, FILM COATED ORAL EVERY 6 HOURS PRN
Status: DISCONTINUED | OUTPATIENT
Start: 2017-03-04 | End: 2017-03-05 | Stop reason: HOSPADM

## 2017-03-04 RX ORDER — ACETAMINOPHEN 325 MG/1
650 TABLET ORAL EVERY 4 HOURS PRN
Status: DISCONTINUED | OUTPATIENT
Start: 2017-03-04 | End: 2017-03-05 | Stop reason: HOSPADM

## 2017-03-04 RX ORDER — HYDROCORTISONE 2.5 %
CREAM (GRAM) TOPICAL 3 TIMES DAILY PRN
Status: DISCONTINUED | OUTPATIENT
Start: 2017-03-04 | End: 2017-03-05 | Stop reason: HOSPADM

## 2017-03-04 RX ADMIN — SENNOSIDES AND DOCUSATE SODIUM 1 TABLET: 8.6; 5 TABLET ORAL at 08:41

## 2017-03-04 RX ADMIN — SENNOSIDES AND DOCUSATE SODIUM 1 TABLET: 8.6; 5 TABLET ORAL at 22:36

## 2017-03-04 NOTE — PLAN OF CARE
Problem: Goal Outcome Summary  Goal: Goal Outcome Summary  Outcome: Improving  Pt VSS  Pain treated with Ibuprofen, ice and tucks. Breast feeding  as well as supplementing with 5ml of Donor milk. Has voided post delivery. FOB at bedside and supportive. Bonding well with . Meeting expected goals.

## 2017-03-04 NOTE — PROGRESS NOTES
Data: Lucille Nathan transferred to 431 via wheelchair at 815pm. Baby transferred via mother's arms.  Action: Receiving unit notified of transfer: Yes. Patient and family notified of room change. Continued care with Julia ZHANG. Belongings sent to receiving unit. Accompanied by Registered Nurse. Oriented patient to surroundings. Call light within reach. ID bands double-checked with receiving RN.  Response: Patient tolerated transfer and is stable.

## 2017-03-04 NOTE — PROGRESS NOTES
[facility] Obstetrics Post-Partum Progress Note          Assessment and Plan:    Assessment:   Post-partum day #1    spontaneous vaginal delivery  PPROM 35 wks  L&D complications: None      Doing well.  No excessive bleeding  Pain well-controlled.      Plan:   Ambulation encouraged  Breast feeding strategies discussed  Monitor wound for signs of infection  Pain control measures as needed  Reportable signs and symptoms dicussed with the patient  Anticipate discharge tomorrow           Interval History:   Doing well.  Pain is well-controlled.  No fevers.  No history of foul-smelling vaginal discharge.  Good appetite.  Denies chest pain, shortness of breath, nausea or vomiting.  Vaginal bleeding is similar to a heavy menstrual flow.  Ambulatory.  Breastfeeding well.          Significant Problems:      Past Medical History   Diagnosis Date     NO ACTIVE PROBLEMS              Review of Systems:    The patient denies any chest pain, shortness of breath, excessive pain, fever, chills, purulent drainage from the wound, nausea or vomiting.          Medications:     All medications related to the patient's surgery have been reviewed  Current Facility-Administered Medications   Medication     ibuprofen (ADVIL/MOTRIN) tablet 400-800 mg     acetaminophen (TYLENOL) tablet 650 mg     naloxone (NARCAN) injection 0.1-0.4 mg     senna-docusate (SENOKOT-S;PERICOLACE) 8.6-50 MG per tablet 1-2 tablet     [START ON 3/5/2017] bisacodyl (DULCOLAX) Suppository 10 mg     [START ON 3/5/2017] sodium phosphate (FLEET ENEMA) 1 enema     hydrocortisone 2.5 % cream     lanolin ointment     lactated ringers BOLUS 1,000 mL     oxytocin (PITOCIN) 30 units in 500 mL 0.9% NaCl infusion     oxytocin (PITOCIN) injection 10 Units     misoprostol (CYTOTEC) tablet 400 mcg     NO Rho (D) immune globulin (RhoGam) needed - mother Rh POSITIVE     HYDROcodone-acetaminophen (NORCO) 5-325 MG per tablet 1-2 tablet     No MMR Needed - Assessment: Patient does  not need MMR vaccine     No Tdap Needed - Assessment: Patient does not need Tdap vaccine             Physical Exam:   Vitals were reviewed  All vitals stable  Temp: 97.7  F (36.5  C) Temp src: Oral BP: 129/85 Pulse: 98 Heart Rate: 65 Resp: 18        Uterine fundus is firm, non-tender and at the level of the umbilicus          Data:     All laboratory data related to this surgery reviewed  Hemoglobin   Date Value Ref Range Status   03/04/2017 12.6 11.7 - 15.7 g/dL Final   03/03/2017 13.8 11.7 - 15.7 g/dL Final   03/03/2017 13.8 11.7 - 15.7 g/dL Final   01/13/2017 12.6 11.7 - 15.7 g/dL Final   08/24/2016 13.4 11.7 - 15.7 g/dL Final     No imaging studies have been ordered    Marcelo Funez MD

## 2017-03-04 NOTE — PLAN OF CARE
Problem: Goal Outcome Summary  Goal: Goal Outcome Summary  Outcome: Improving  Meeting goals for shift, see flow sheet. Denies pain. Breast feeding and pumping, Spouse here and supportive. Caring for self and infant in room.

## 2017-03-04 NOTE — LACTATION NOTE
Lactation in to see patient. Baby is a 35 weeker. Latches with no suck, shield given with no results. Sterile water placed on top of shield. Discussed late term new born behaviors. Writer finger fed juan diego breast milk. Baby did have emesis. Plan to start pumping in am, hand expression video shown. Discussed importance of this after feeds. Big drops of colostrum seen with hand expressing. Encouraged to call for assistance.

## 2017-03-04 NOTE — ANESTHESIA POSTPROCEDURE EVALUATION
Patient: Lucille Nathan    * No procedures listed *    Diagnosis:* No pre-op diagnosis entered *  Diagnosis Additional Information: Labor pain    Anesthesia Type:  No value filed.    Note:  Anesthesia Post Evaluation         Comments:     S/P epidural for labor.   I or my partner was immediately available for management of this patient during epidural analgesia infusion.  VSS.  Doing well. Block resolved.  Neuro at baseline. Denies positional headache. Minimal side effects easily managed w/ PRN meds. No apparent anesthetic complications. No follow-up required.    Jonh Coleman MD        Last vitals:  Vitals:    03/03/17 2028 03/03/17 2043 03/04/17 0121   BP: 121/65 125/73 128/78   Pulse:   98   Resp:   16   Temp:   98.6  F (37  C)         Electronically Signed By: Jonh Coleman MD  March 4, 2017  7:50 AM

## 2017-03-04 NOTE — PLAN OF CARE
Problem: Goal Outcome Summary  Goal: Goal Outcome Summary  Outcome: Improving  Vss, fundal checks wnl, firm at u2 with scant rubra, up ad lety, voiding w/o diff., IV removed, breastfeeding with some assistance latching, attentive to infant needs and cares, denies pain - but has ibuprofen available, progressing per plan of care, continue to monitor.

## 2017-03-05 VITALS
SYSTOLIC BLOOD PRESSURE: 135 MMHG | TEMPERATURE: 98.1 F | RESPIRATION RATE: 18 BRPM | HEART RATE: 98 BPM | DIASTOLIC BLOOD PRESSURE: 85 MMHG

## 2017-03-05 PROCEDURE — 25000132 ZZH RX MED GY IP 250 OP 250 PS 637: Performed by: OBSTETRICS & GYNECOLOGY

## 2017-03-05 RX ADMIN — SENNOSIDES AND DOCUSATE SODIUM 2 TABLET: 8.6; 5 TABLET ORAL at 08:12

## 2017-03-05 NOTE — PLAN OF CARE
Problem: Goal Outcome Summary  Goal: Goal Outcome Summary  Outcome: Improving  Patient stable this shift. Assisted with breastfeeding. No concerns at this time

## 2017-03-05 NOTE — PLAN OF CARE
Problem: Discharge Planning  Goal: Discharge Planning (Adult, OB, Behavioral, Peds)  Outcome: Adequate for Discharge Date Met:  03/05/17  AVS/DC teaching and medications reviewed with patient. Patient is aware of when to call and follow-up. Patient is aware of resources available.  Aware of [place] scale and when to retake and call.Teaching is completed. Home care for first time breast feeding mother. Discharged to home with baby

## 2017-03-05 NOTE — PLAN OF CARE
Problem: Goal Outcome Summary  Goal: Goal Outcome Summary  Outcome: Adequate for Discharge Date Met:  03/05/17  Meeting goals for shift and discharge to home, see flow sheet. Caring for self and infant in room, spouse present and supportive. Patient pumping, nipples tender and is using mothers love cream. Discharge to home today.

## 2017-03-05 NOTE — PROGRESS NOTES
[facility]   Post-Partum Progress Note          Assessment and Plan:    Assessment:   Post-partum day #2  Normal spontaneous vaginal delivery  L&D complications: PPROM at 35 wks      Doing well.  No excessive bleeding  Pain well-controlled.      Plan:   Ambulation encouraged  Pain control measures as needed  Discharge later today           Interval History:   Doing well.  Pain is well-controlled.  No fevers.  No history of foul-smelling vaginal discharge.  Good appetite.  Denies chest pain, shortness of breath, nausea or vomiting.  Vaginal bleeding is similar to a heavy menstrual flow.  Ambulatory.  Breastfeeding well.           Significant Problems:      Patient Active Problem List   Diagnosis     CARDIOVASCULAR SCREENING; LDL GOAL LESS THAN 160     Female fertility problem     History of dysplastic nevus     Supervision of normal first pregnancy     Encounter for triage in pregnant patient     Indication for care in labor or delivery      (spontaneous vaginal delivery)             Review of Systems:    The Review of Systems is negative other than noted in the HPI          Medications:   All medications related to the patient's surgery have been reviewed  Current Facility-Administered Medications   Medication     ibuprofen (ADVIL/MOTRIN) tablet 400-800 mg     acetaminophen (TYLENOL) tablet 650 mg     naloxone (NARCAN) injection 0.1-0.4 mg     senna-docusate (SENOKOT-S;PERICOLACE) 8.6-50 MG per tablet 1-2 tablet     bisacodyl (DULCOLAX) Suppository 10 mg     sodium phosphate (FLEET ENEMA) 1 enema     hydrocortisone 2.5 % cream     lanolin ointment     lactated ringers BOLUS 1,000 mL     oxytocin (PITOCIN) 30 units in 500 mL 0.9% NaCl infusion     oxytocin (PITOCIN) injection 10 Units     misoprostol (CYTOTEC) tablet 400 mcg     NO Rho (D) immune globulin (RhoGam) needed - mother Rh POSITIVE     HYDROcodone-acetaminophen (NORCO) 5-325 MG per tablet 1-2 tablet     No MMR Needed - Assessment: Patient  does not need MMR vaccine     No Tdap Needed - Assessment: Patient does not need Tdap vaccine             Physical Exam:   All vitals stable    Vital signs:  Temp: 98.1  F (36.7  C) Temp src: Oral BP: 135/85   Heart Rate: 82 Resp: 18            Estimated body mass index is 23.63 kg/(m^2) as calculated from the following:    Height as of 2/7/17: 6' (1.829 m).    Weight as of 2/21/17: 174 lb 3.2 oz (79 kg).      Uterine fundus is firm, non-tender and at the level of the umbilicus          Data:     All laboratory data related to this surgery reviewed  Hemoglobin   Date Value Ref Range Status   03/04/2017 12.6 11.7 - 15.7 g/dL Final   03/03/2017 13.8 11.7 - 15.7 g/dL Final   03/03/2017 13.8 11.7 - 15.7 g/dL Final   01/13/2017 12.6 11.7 - 15.7 g/dL Final   08/24/2016 13.4 11.7 - 15.7 g/dL Final     No imaging studies have been ordered    Nichol Young MD

## 2017-03-05 NOTE — PLAN OF CARE
Problem: Goal Outcome Summary  Goal: Goal Outcome Summary  Outcome: Improving  Patient stable and meeting expected outcomes. Independent with cares and tolerating activity well. Fundal checks and bleeding WDL. Pain managed with rest and support person. Breastfeeding infant with minimal assistance from staff. Bonding with . Father of  at bedside and supportive.

## 2017-03-05 NOTE — LACTATION NOTE
Lactation in to see patient. Baby latching and suckling better at the breast, not many swallows heard. Pumping after nursing with small volumes, and we are supplementing with ebm, and juan diego breast milk. Encouraged patient to continue with plan until baby at birth weight or baby is term. Supplement by finger feeding. Father did a finger feed, and writer did the other. Encouraged patient to attempt to feed every 2-3 hours. Will continue to assist with feeds. Patient deciding on getting a take home pump, or using her insurance to rent a hospital grade pump.

## 2017-03-10 ENCOUNTER — HOSPITAL ENCOUNTER (OUTPATIENT)
Dept: OBGYN | Facility: CLINIC | Age: 32
Discharge: HOME | End: 2017-03-10
Attending: OBSTETRICS & GYNECOLOGY | Admitting: OBSTETRICS & GYNECOLOGY
Payer: COMMERCIAL

## 2017-03-10 PROCEDURE — 99215 OFFICE O/P EST HI 40 MIN: CPT

## 2017-03-10 NOTE — CONSULTS
LACTATION CONSULT/PHYSICIAN REPORT  [facility]       MOTHER    Doctor: Jas     MOTHER'S CONCERNS: Latch problems, Milk transfer at breast and falls asleep at the breast, postpartum care of lactating mother    Medical History: None    Pregnancy History/Breastfeeding History  G 1 P 1  No problems with this pregnancy     Delivery History      Labor Meds/Anesthesia  Epidural    Current Medications/Herbals  Prenatal vitamins and Vit D    ASSESSMENT OF MOTHER    Physical: Good    Milk Supply: WNL for age    PUMPING: Pump in Style and Other: Started pumping in the hospital because of LPT status. Now mom is pumping 5 x/day and gets 50 cc after nursing      BABY: Mario Age: 7 days Birth Date: 3/3/17 Gestational Age: 35 weeks    Doctor: Davis    Complications of Birth: None    Breastfeeding/hospital: Other: Needed lactation help in the hospital to help with latch    ASSESSMENT OF BABY    Physical: WNL jaundice    SUPPLEMENTATION: Donor milk in the hospital and then EBM at home. 4-5 x/day offer 15 to 25 cc by finger feeding     OUTPUT:  WNL     BABY'S WEIGHT HISTORY    At Delivery:  Date: 3/3/17 Weight: 6-9.5    At Discharge:  Date: 3/5 Weight: 6-3.5   3/3/2017 3/4/2017 3/5/2017 3/7/2017 3/7/2017   Birth Weight 6 lb 9.5 oz   6 lb 9.5 oz 6 lb 9.5 oz   R/C Weights 6 lb 9.5 oz 6 lb 5.1 oz 6 lb 3.5 oz  6 lb 1.5 oz     Age: 7 days  Date: 3/7 Weight: 6-1.2   Average wt gain is 1/2 to 1 oz/day or 4-7 oz/week    FEEDING ASSESSMENT    BEFORE INTERVENTION: Pain Levels: L & R occ for a few seconds       INTERVENTIONS/EVALUATION:  Asymmetric Latch, Flange lips, Breast Compression and Other: Watched for choking at the breast and stopped feeding each time for baby to handle the flow    WEIGHT GAIN AT BREAST: (NOTE: 30cc = 1 oz):  At current weight, baby needs approximately 16.2 oz in 24 hours or 2 oz every 3 hours (61 cc)    Number order of breastfeeding    20 cc 1st side RIGHT breast after 10 minutes    14 cc 2  nd side LEFT breast after 12 minutes  2 cc 3rd side RIGHT breast after 2 minutes but with much coaxing  0 cc 4th LEFT breast after < 1 minutes    TOTAL: 36 cc or 1.2 oz after < 24 minutes    SUMMARY  Baby is acting like a LPT who has tired at the breast. He is not able to complete a feeding at the breast yet. Will encourage resting for a feeding or two and see if he will have more energy to complete feedings at the breast.     RECOMMENDATIONS  1. Tickle the lips and have him open wide for best latch  2. Lift the breast to better keep the nipple in place  3. Watch his lower lip to keep it out   4. Continue pumping after feedings until he can draw full feedings from the breast  5. If he nurses like this, he will need about 1 oz after each feeding  6. You can try a bottle for his supplemental bottles  7. Pump 6 x/day until he is doing better at the breast  8. I will lucy Monday or Tues for progress    Follow up: Patient to call lactation consultant if questions arise    Next visit/Phone call: Doctor: Monday for Wt check with Dr Cannon      Lactation Consultant: Maria Dolores Beltran RN  Date: 3/10/2017    Start: 1302    End: 1411    60 minute Lactation Consultation with > 50% of time spent on breastfeeding counseling and coordination of care.    [facility] LACTATION OFFICE  PHONE: [phone] FAX: [phone]

## 2017-03-14 ENCOUNTER — TELEPHONE (OUTPATIENT)
Dept: OBGYN | Facility: CLINIC | Age: 32
End: 2017-03-14

## 2017-04-18 ENCOUNTER — PRENATAL OFFICE VISIT (OUTPATIENT)
Dept: OBGYN | Facility: CLINIC | Age: 32
End: 2017-04-18
Payer: COMMERCIAL

## 2017-04-18 VITALS — SYSTOLIC BLOOD PRESSURE: 102 MMHG | BODY MASS INDEX: 21.6 KG/M2 | DIASTOLIC BLOOD PRESSURE: 68 MMHG | WEIGHT: 159.3 LBS

## 2017-04-18 PROCEDURE — 99207 ZZC POST PARTUM EXAM: CPT | Performed by: OBSTETRICS & GYNECOLOGY

## 2017-04-18 RX ORDER — ACETAMINOPHEN AND CODEINE PHOSPHATE 120; 12 MG/5ML; MG/5ML
1 SOLUTION ORAL DAILY
Qty: 112 TABLET | Refills: 3 | Status: SHIPPED | OUTPATIENT
Start: 2017-04-18

## 2017-04-18 NOTE — MR AVS SNAPSHOT
After Visit Summary   4/18/2017    Lucille Nathan    MRN: [id number]           Patient Information     Date Of Birth          1985        Visit Information        Provider Department      4/18/2017 9:00 AM Sonia Mark MD [facility]        Today's Diagnoses     Routine postpartum follow-up    -  1       Follow-ups after your visit        Your next 10 appointments already scheduled     Jul 07, 2017  2:00 PM CDT   Office Visit with Nica Aguilar MD   [facility] ([facility] )    [address] [place] [address]  [address] [place] [id number]   [phone]           Bring a current list of meds and any records pertaining to this visit.  For Physicals, please bring immunization records and any forms needing to be filled out.  Please arrive 10 minutes early to complete paperwork.              Who to contact     If you have questions or need follow up information about today's clinic visit or your schedule please contact [facility] directly at [phone].  Normal or non-critical lab and imaging results will be communicated to you by Houzzhart, letter or phone within 4 business days after the clinic has received the results. If you do not hear from us within 7 days, please contact the clinic through [employer] Doctor[employer] or phone. If you have a critical or abnormal lab result, we will notify you by phone as soon as possible.  Submit refill requests through Smart Picture Tech or call your pharmacy and they will forward the refill request to us. Please allow 3 business days for your refill to be completed.          Additional Information About Your Visit        Houzzhart Information     [employer] gives you secure access to your electronic health record. If you see a primary care provider, you can also send messages to your care team and make appointments. If you have questions, please call your primary care clinic.  If you do not have  a primary care provider, please call [phone] and they will assist you.        Care EveryWhere ID     This is your Care EveryWhere ID. This could be used by other organizations to access your [facility] medical records  [id number]        Your Vitals Were     Last Period BMI (Body Mass Index)                02/02/2016 (Exact Date) 21.6 kg/m2           Blood Pressure from Last 3 Encounters:   04/18/17 102/68   03/05/17 135/85   03/03/17 136/83    Weight from Last 3 Encounters:   04/18/17 159 lb 4.8 oz (72.3 kg)   02/21/17 174 lb 3.2 oz (79 kg)   02/07/17 175 lb 3.2 oz (79.5 kg)              Today, you had the following     No orders found for display         Today's Medication Changes          These changes are accurate as of: 4/18/17  9:32 AM.  If you have any questions, ask your nurse or doctor.               Start taking these medicines.        Dose/Directions    norethindrone 0.35 MG per tablet   Commonly known as:  MICRONOR   Used for:  Routine postpartum follow-up   Started by:  Sonia Mark MD        Dose:  1 tablet   Take 1 tablet (0.35 mg) by mouth daily   Quantity:  112 tablet   Refills:  3            Where to get your medicines      These medications were sent to [facility] [place], [place] - [address] [place] [facility] [address]     Phone:  [phone]     norethindrone 0.35 MG per tablet                Primary Care Provider Office Phone # Fax #    Rocio Tolliver [phone]       [facility] [address] [place] [id number]        Thank you!     Thank you for choosing [facility]  for your care. Our goal is always to provide you with excellent care. Hearing back from our patients is one way we can continue to improve our services. Please take a few minutes to complete the written survey that you may receive in the mail after your visit with us. Thank you!             Your Updated Medication List - Protect  others around you: Learn how to safely use, store and throw away your medicines at www.disposemymeds.org.          This list is accurate as of: 4/18/17  9:32 AM.  Always use your most recent med list.                   Brand Name Dispense Instructions for use    norethindrone 0.35 MG per tablet    MICRONOR    112 tablet    Take 1 tablet (0.35 mg) by mouth daily       prenatal multivitamin  plus iron 27-0.8 MG Tabs per tablet      Take 1 tablet by mouth daily       TUMS 500 MG chewable tablet   Generic drug:  calcium carbonate      Take 1 chew tab by mouth daily

## 2017-04-18 NOTE — PROGRESS NOTES
SUBJECTIVE: Lucille is here for a 6-week postpartum checkup.    Delivery date was 3/3/2017. She had a  of a viable boy, weight 6 pounds 9 oz., with  Labor complications.  Since delivery, she has been breast feeding.  She has no signs of infection, bleeding or other complications.  She is not pregnant.  We discussed contraceptions and she has chosen mini pill / progesterone only pill.  She  has not had intercourse since delivery and complains of No discomfort. Patient screened for postpartum depression and complaints are NEGATIVE. Screening has also been completed for intimate partner violence.  Brigitte Means MA    Doing well: reports good mood, no issues. No vaginal bleeding. Normal bowel and bladder function. No intercourse yet. Considering minipill. Pumping and bottle feeding breast milk exclusively.    EXAM:  Today's Depression Rating was   PHQ-9 SCORE 2017   Total Score 0       Gen: mood and affect normal and bright.  Abdomen: soft, flat, nontender.  Ext: negative.  Pelvis: External genitalia, [place], urethral, and [place] glands within normal limits. On bimanual exam, uterus is midposition to anteverted, nontender, mobile. Adnexa negative bilaterally for mass or tenderness.      ASSESSMENT:   Normal postpartum exam after  PROM and .    PLAN:  Return as needed or at time of next expected pap, pelvic, or breast exam. Discussed the differences between the progesterone-only pill and the combination oral contraceptive pills. Specifically, discussed taking this at the same time every day (within a 3 hour window), using back up contraception for 2-4 weeks after starting, using back up contraception for one week if she misses or is late taking a pill, and that there are no placebo pills in the pack. She states her understanding.    Sonia Mark MD

## 2017-04-18 NOTE — NURSING NOTE
Chief Complaint   Patient presents with     Post Partum Exam       Initial /68 (BP Location: Right arm, Patient Position: Chair, Cuff Size: Adult Regular)  Wt 159 lb 4.8 oz (72.3 kg)  LMP 2016 (Exact Date)  BMI 21.6 kg/m2 Estimated body mass index is 21.6 kg/(m^2) as calculated from the following:    Height as of 17: 6' (1.829 m).    Weight as of this encounter: 159 lb 4.8 oz (72.3 kg).  BP completed using cuff size: regular        The following HM Due: NONE

## 2017-04-19 ASSESSMENT — PATIENT HEALTH QUESTIONNAIRE - PHQ9: SUM OF ALL RESPONSES TO PHQ QUESTIONS 1-9: 0

## 2017-04-20 ENCOUNTER — TELEPHONE (OUTPATIENT)
Dept: OBGYN | Facility: CLINIC | Age: 32
End: 2017-04-20

## 2017-04-20 NOTE — TELEPHONE ENCOUNTER
NOREEN to call clinic back regarding forms.    I called pt to find out when she is going back to work. Pt's [employer] for is signed and waiting for return to work date for completion. Advise pt that form is ready to be picked up once we know her return to work date.    Form is at back nurse station in [facility].  Elinor Tejeda CMA

## 2017-04-20 NOTE — TELEPHONE ENCOUNTER
Form received from: patient    Form requesting following info/need: [employer] / [employer]    AGUSTO needed?: No    Location of form: Brigitte / Dr. Mark    When completed the route for return: Patient  - call patient at [phone] when form has been completed

## 2017-04-20 NOTE — TELEPHONE ENCOUNTER
Patient returned the call.  Form completed and at OB/GYN  for patient to .  Copy of the form sent to [employer].

## 2017-07-14 ENCOUNTER — OFFICE VISIT (OUTPATIENT)
Dept: FAMILY MEDICINE | Facility: CLINIC | Age: 32
End: 2017-07-14
Payer: COMMERCIAL

## 2017-07-14 DIAGNOSIS — D22.9 MELANOCYTIC NEVI, UNSPECIFIED: ICD-10-CM

## 2017-07-14 DIAGNOSIS — Z12.83 SKIN CANCER SCREENING: Primary | ICD-10-CM

## 2017-07-14 DIAGNOSIS — Z86.018 HISTORY OF DYSPLASTIC NEVUS: ICD-10-CM

## 2017-07-14 DIAGNOSIS — L72.0 EPIDERMAL CYST: ICD-10-CM

## 2017-07-14 PROCEDURE — 99213 OFFICE O/P EST LOW 20 MIN: CPT | Performed by: FAMILY MEDICINE

## 2017-07-14 NOTE — PATIENT INSTRUCTIONS
"FUTURE APPOINTMENTS  Follow up in 1 year(s)  Contact me if you notice any changes in your skin    SUN PROTECTION INSTRUCTIONS  Sun damage can lead to skin cancer and premature aging of the skin.      The best way to protect from sun damage to your skin is to avoid the sun during peak hours (10 am - 2 pm) even on overcast days.      Use UPF sun-protective clothing, which while more expensive initially provides longer lasting coverage without having to worry about remembering to re-apply.  1. Wear a wide-brimmed hat and sunglasses.   2. Wear sun-protective clothing.  [employer] and other [employer] make sun protective clothing that are stylish, comfortable and cool. [employer] and other [employer] make UV arm sleeves suitable for golfing, gardening and other activities.      Sunscreen instructions:  1. Use sunscreens with Zinc Oxide, Titanium Dioxide or Avobenzone to protect from UVA rays.  2. Use SPF 30-50+ to protect from UVB rays.  3. Re-apply every 2 hours even if water resistant.  4. Apply on your face every day even when cloudy and even in the winter. UVA \"aging rays\" penetrate window glass and are just as strong in the winter as in the summer.    Product Recommendations:    Good examples include: Blue Lizard, EltaMD, Solbar    Good daily moisturizers with SPF: Vanicream, CeraVe.    For sensitive skin, consider : SkinMedica Essential Defense Mineral Shield Broad Spectrum SPF 35      Never use tanning beds. Tanning beds are associated with much higher risks of skin cancer.    All tanning damages the skin. Aim for ivory skin year round and you will have less trouble with your skin in years to come. There is no merit in getting \"a base tan\" before a warm weather vacation, as any tanning indicates your body's response to sun damage.    Stop smoking. Smokers have higher rates of skin cancer and also have premature skin wrinkling.    FYI  You should use about 3 tablespoons of sunscreen to protect your whole " body. Thus a typical eight ounce bottle of sunscreen should last 4 applications. Remember, that the SPF rating is compromised if you don t apply enough. Most people only apply 1/2 - 1/3 of the amount they need. Also don t forget areas such as your ears, feet, upper back and harder to reach places. Keep in mind that these amounts should be increased for larger body sizes.    Sunscreens with titanium dioxide and/or zinc oxide in the active ingredients are physical blockers as opposed to chemical blockers. Chemical-free sunscreens should not irritate the skin.    Spray-on sunscreens may be used for touch-up application only, not as a base layer. Also, use with caution around small children due to inhalation risk.    Avoid retinyl palmitate products.    Avoid combination products that include both sunscreen and insect repellant, as sunscreen should be applied every 2 hours, but insect repellant should not be applied as frequently.    SPF means sun protection factor, which is just the degree to which the sunscreen can protect against UVB rays. There is no rating system for UVA rays. SPF is calculated as the time skin will burn when sunscreen is applied vs. skin without sunscreen.    Water resistant sunscreens should be re-applied every 1-2 hours.    For more information:  http://www.skincancer.org/prevention/sun-protection/sunscreen/sunscreens-safe-and-effective

## 2017-07-14 NOTE — PROGRESS NOTES
[facility] - PRIMARY CARE SKIN    CC : skin cancer screening (full-body)  SUBJECTIVE:                                                    Lucille Nathan is a 31 year old female who presents to clinic today for a full-body skin exam because of history of dysplastic nevus on the mid-abdomen. Last full-body skin cancer screening was Jan 2017.    Personal history of skin cancer : NO - history of dysplastic nevi.  Family history of skin cancer : NO.    Sun Exposure History  Sunscreen Use : YES.  Previous history of significant sun exposure: YES - tanned extensively in college     Occupation : office (indoor).    Refer to electronic medical record (EMR) for past medical history and medications.    INTEGUMENTARY: POSITIVE for cyst  ROS : 14 point review of systems was negative except the symptoms listed above in the HPI.    This document serves as a record of the services and decisions personally performed and made by Natasha Aguilar MD. It was created on her behalf by Lyn Zamora, a trained medical scribe.  The creation of this document is based on the scribe's personal observations and the provider's statements to the medical scribe.  Lyn Zamora, July 14, 2017 10:14 AM      OBJECTIVE:                                                    GENERAL: healthy, alert and no distress  SKIN: [place] Skin Type - I.  This patient was examined from the top of the head to the bottom of the feet  including scalp, face, neck, back, chest, breasts, buttocks, both arms, both legs, both hands, both feet, all 10 fingers and all 10 toes. The dermatoscope was used to help evaluate pigmented lesions.  Skin Pertinent Findings:    Arms: scattered 2 mm - 3 mm in size, brown macules most consistent with benign nevi (melanocytic nevi)    Right medial great toe: 2mm in size, brown macules most consistent with benign nevi (melanocytic nevi)    Dorsum left toe: 1mm in size, brown macules most consistent with benign nevi (melanocytic  "nevi)    Right mid-anterior thigh: 3mm benign epidermoid cyst    Back : scattered 2 mm - 6 mm in size, brown macules most consistent with benign nevi (melanocytic nevi)    Diagnostic Test Results:  none           ASSESSMENT:                                                      Encounter Diagnoses   Name Primary?     Skin cancer screening Yes     Melanocytic nevi, unspecified      Epidermal cyst      History of dysplastic nevus          PLAN:                                                    Patient Instructions   FUTURE APPOINTMENTS  Follow up in 1 year(s)  Contact me if you notice any changes in your skin    SUN PROTECTION INSTRUCTIONS  Sun damage can lead to skin cancer and premature aging of the skin.      The best way to protect from sun damage to your skin is to avoid the sun during peak hours (10 am - 2 pm) even on overcast days.      Use UPF sun-protective clothing, which while more expensive initially provides longer lasting coverage without having to worry about remembering to re-apply.  1. Wear a wide-brimmed hat and sunglasses.   2. Wear sun-protective clothing.  [employer] and other [employer] make sun protective clothing that are stylish, comfortable and cool. [employer] and other [employer] make UV arm sleeves suitable for golfing, gardening and other activities.      Sunscreen instructions:  1. Use sunscreens with Zinc Oxide, Titanium Dioxide or Avobenzone to protect from UVA rays.  2. Use SPF 30-50+ to protect from UVB rays.  3. Re-apply every 2 hours even if water resistant.  4. Apply on your face every day even when cloudy and even in the winter. UVA \"aging rays\" penetrate window glass and are just as strong in the winter as in the summer.    Product Recommendations:    Good examples include: Diego Jarquin, Solbar    Good daily moisturizers with SPF: Vanicream, CeraVe.    For sensitive skin, consider : SkinMedica Essential Defense Mineral Shield Broad Spectrum SPF 35      Never use " "tanning beds. Tanning beds are associated with much higher risks of skin cancer.    All tanning damages the skin. Aim for ivory skin year round and you will have less trouble with your skin in years to come. There is no merit in getting \"a base tan\" before a warm weather vacation, as any tanning indicates your body's response to sun damage.    Stop smoking. Smokers have higher rates of skin cancer and also have premature skin wrinkling.    FYI  You should use about 3 tablespoons of sunscreen to protect your whole body. Thus a typical eight ounce bottle of sunscreen should last 4 applications. Remember, that the SPF rating is compromised if you don t apply enough. Most people only apply 1/2 - 1/3 of the amount they need. Also don t forget areas such as your ears, feet, upper back and harder to reach places. Keep in mind that these amounts should be increased for larger body sizes.    Sunscreens with titanium dioxide and/or zinc oxide in the active ingredients are physical blockers as opposed to chemical blockers. Chemical-free sunscreens should not irritate the skin.    Spray-on sunscreens may be used for touch-up application only, not as a base layer. Also, use with caution around small children due to inhalation risk.    Avoid retinyl palmitate products.    Avoid combination products that include both sunscreen and insect repellant, as sunscreen should be applied every 2 hours, but insect repellant should not be applied as frequently.    SPF means sun protection factor, which is just the degree to which the sunscreen can protect against UVB rays. There is no rating system for UVA rays. SPF is calculated as the time skin will burn when sunscreen is applied vs. skin without sunscreen.    Water resistant sunscreens should be re-applied every 1-2 hours.    For more information:  http://www.skincancer.org/prevention/sun-protection/sunscreen/sunscreens-safe-and-effective          PROCEDURES:                               "                      None.    TT : 25 minutes.  CT : 15 minutes.      The information in this document, created by the medical scribe for me, accurately reflects the services I personally performed and the decisions made by me. I have reviewed and approved this document for accuracy prior to leaving the patient care area.  Natasha Aguilar MD July 14, 2017 10:14 AM  [facility] - PRIMARY CARE SKIN

## 2017-07-14 NOTE — MR AVS SNAPSHOT
"              After Visit Summary   7/14/2017    Lucille Nathan    MRN: [id number]           Patient Information     Date Of Birth          1985        Visit Information        Provider Department      7/14/2017 10:00 AM Nica Aguilar MD [facility] - Primary Care Skin        Today's Diagnoses     Skin cancer screening    -  1    Melanocytic nevi, unspecified        Epidermal cyst        History of dysplastic nevus          Care Instructions    FUTURE APPOINTMENTS  Follow up in 1 year(s)  Contact me if you notice any changes in your skin    SUN PROTECTION INSTRUCTIONS  Sun damage can lead to skin cancer and premature aging of the skin.      The best way to protect from sun damage to your skin is to avoid the sun during peak hours (10 am - 2 pm) even on overcast days.      Use UPF sun-protective clothing, which while more expensive initially provides longer lasting coverage without having to worry about remembering to re-apply.  1. Wear a wide-brimmed hat and sunglasses.   2. Wear sun-protective clothing.  [employer] and other [employer] make sun protective clothing that are stylish, comfortable and cool. [employer] and other [employer] make UV arm sleeves suitable for golfing, gardening and other activities.      Sunscreen instructions:  1. Use sunscreens with Zinc Oxide, Titanium Dioxide or Avobenzone to protect from UVA rays.  2. Use SPF 30-50+ to protect from UVB rays.  3. Re-apply every 2 hours even if water resistant.  4. Apply on your face every day even when cloudy and even in the winter. UVA \"aging rays\" penetrate window glass and are just as strong in the winter as in the summer.    Product Recommendations:    Good examples include: Gopi Cabral, [employer], Solbar    Good daily moisturizers with SPF: Vanicream, CeraVe.    For sensitive skin, consider : SkinMedica Essential Defense Mineral Shield Broad Spectrum SPF 35      Never use tanning beds. Tanning beds are associated with " "much higher risks of skin cancer.    All tanning damages the skin. Aim for ivory skin year round and you will have less trouble with your skin in years to come. There is no merit in getting \"a base tan\" before a warm weather vacation, as any tanning indicates your body's response to sun damage.    Stop smoking. Smokers have higher rates of skin cancer and also have premature skin wrinkling.    FYI  You should use about 3 tablespoons of sunscreen to protect your whole body. Thus a typical eight ounce bottle of sunscreen should last 4 applications. Remember, that the SPF rating is compromised if you don t apply enough. Most people only apply 1/2 - 1/3 of the amount they need. Also don t forget areas such as your ears, feet, upper back and harder to reach places. Keep in mind that these amounts should be increased for larger body sizes.    Sunscreens with titanium dioxide and/or zinc oxide in the active ingredients are physical blockers as opposed to chemical blockers. Chemical-free sunscreens should not irritate the skin.    Spray-on sunscreens may be used for touch-up application only, not as a base layer. Also, use with caution around small children due to inhalation risk.    Avoid retinyl palmitate products.    Avoid combination products that include both sunscreen and insect repellant, as sunscreen should be applied every 2 hours, but insect repellant should not be applied as frequently.    SPF means sun protection factor, which is just the degree to which the sunscreen can protect against UVB rays. There is no rating system for UVA rays. SPF is calculated as the time skin will burn when sunscreen is applied vs. skin without sunscreen.    Water resistant sunscreens should be re-applied every 1-2 hours.    For more information:  http://www.skincancer.org/prevention/sun-protection/sunscreen/sunscreens-safe-and-effective            Follow-ups after your visit        Who to contact     If you have questions or need " follow up information about today's clinic visit or your schedule please contact [facility] - PRIMARY CARE SKIN directly at [phone].  Normal or non-critical lab and imaging results will be communicated to you by MyChart, letter or phone within 4 business days after the clinic has received the results. If you do not hear from us within 7 days, please contact the clinic through [employer] or phone. If you have a critical or abnormal lab result, we will notify you by phone as soon as possible.  Submit refill requests through [employer] or call your pharmacy and they will forward the refill request to us. Please allow 3 business days for your refill to be completed.          Additional Information About Your Visit        [employer] Information     [employer] gives you secure access to your electronic health record. If you see a primary care provider, you can also send messages to your care team and make appointments. If you have questions, please call your primary care clinic.  If you do not have a primary care provider, please call [phone] and they will assist you.        Care EveryWhere ID     This is your Care EveryWhere ID. This could be used by other organizations to access your [place] medical records  [id number]         Blood Pressure from Last 3 Encounters:   04/18/17 102/68   03/05/17 135/85   03/03/17 136/83    Weight from Last 3 Encounters:   04/18/17 72.3 kg (159 lb 4.8 oz)   02/21/17 79 kg (174 lb 3.2 oz)   02/07/17 79.5 kg (175 lb 3.2 oz)              Today, you had the following     No orders found for display       Primary Care Provider    [facility]       No address on file        Equal Access to Services     [facility]: Hadii flo manning Soluz maria, waaxda luqadaha, qaybta kaalmada adejose, panchito delgado . So [facility] [phone].    ATENCIÓN: Si habla español, tiene a baez disposición servicios gratuitos de asistencia lingüística. Llame al  [phone].    We comply with applicable federal civil rights laws and [place] laws. We do not discriminate on the basis of race, color, national origin, age, disability sex, sexual orientation or gender identity.            Thank you!     Thank you for choosing [facility] - PRIMARY CARE [facility]  for your care. Our goal is always to provide you with excellent care. Hearing back from our patients is one way we can continue to improve our services. Please take a few minutes to complete the written survey that you may receive in the mail after your visit with us. Thank you!             Your Updated Medication List - Protect others around you: Learn how to safely use, store and throw away your medicines at www.disposemymeds.org.          This list is accurate as of: 7/14/17 10:21 AM.  Always use your most recent med list.                   Brand Name Dispense Instructions for use Diagnosis    norethindrone 0.35 MG per tablet    MICRONOR    112 tablet    Take 1 tablet (0.35 mg) by mouth daily    Routine postpartum follow-up       prenatal multivitamin  plus iron 27-0.8 MG Tabs per tablet      Take 1 tablet by mouth daily        TUMS 500 MG chewable tablet   Generic drug:  calcium carbonate      Take 1 chew tab by mouth daily

## 2018-02-06 ENCOUNTER — TELEPHONE (OUTPATIENT)
Dept: OBGYN | Facility: CLINIC | Age: 33
End: 2018-02-06

## 2018-02-06 DIAGNOSIS — Z30.41 ENCOUNTER FOR SURVEILLANCE OF CONTRACEPTIVE PILLS: Primary | ICD-10-CM

## 2018-02-06 RX ORDER — LEVONORGESTREL AND ETHINYL ESTRADIOL 0.15-0.03
1 KIT ORAL DAILY
Qty: 84 TABLET | Refills: 3 | Status: SHIPPED | OUTPATIENT
Start: 2018-02-06

## 2018-02-06 NOTE — TELEPHONE ENCOUNTER
Pt calling requesting to change her OCP back to what she was taking before she became pregnant. She is no longer breastfeeding and does not need the low estrogen pill. Please advise.      Kristan Marin RN

## 2018-10-09 ENCOUNTER — HEALTH MAINTENANCE LETTER (OUTPATIENT)
Age: 33
End: 2018-10-09

## 2020-03-01 ENCOUNTER — HEALTH MAINTENANCE LETTER (OUTPATIENT)
Age: 35
End: 2020-03-01

## 2020-12-14 ENCOUNTER — HEALTH MAINTENANCE LETTER (OUTPATIENT)
Age: 35
End: 2020-12-14

## 2021-04-17 ENCOUNTER — HEALTH MAINTENANCE LETTER (OUTPATIENT)
Age: 36
End: 2021-04-17

## 2021-10-02 ENCOUNTER — HEALTH MAINTENANCE LETTER (OUTPATIENT)
Age: 36
End: 2021-10-02

## 2022-01-05 NOTE — ADDENDUM NOTE
Addended by: LUIZ TERRY on: 1/13/2017 11:07 AM     Modules accepted: Orders     Scribe Attestation (For Scribes USE Only)... Attending Attestation (For Attendings USE Only).../Scribe Attestation (For Scribes USE Only)...

## 2022-05-14 ENCOUNTER — HEALTH MAINTENANCE LETTER (OUTPATIENT)
Age: 37
End: 2022-05-14

## 2022-09-03 ENCOUNTER — HEALTH MAINTENANCE LETTER (OUTPATIENT)
Age: 37
End: 2022-09-03

## 2023-06-02 ENCOUNTER — HEALTH MAINTENANCE LETTER (OUTPATIENT)
Age: 38
End: 2023-06-02